# Patient Record
Sex: MALE | Race: WHITE | Employment: PART TIME | URBAN - METROPOLITAN AREA
[De-identification: names, ages, dates, MRNs, and addresses within clinical notes are randomized per-mention and may not be internally consistent; named-entity substitution may affect disease eponyms.]

---

## 2017-09-12 PROBLEM — F90.9 ATTENTION DEFICIT DISORDER OF CHILDHOOD WITH HYPERACTIVITY: Status: ACTIVE | Noted: 2017-09-12

## 2019-05-06 ENCOUNTER — HOSPITAL ENCOUNTER (EMERGENCY)
Age: 39
Discharge: HOME | End: 2019-05-06
Attending: EMERGENCY MEDICINE
Payer: COMMERCIAL

## 2019-05-06 VITALS
TEMPERATURE: 98.2 F | WEIGHT: 315 LBS | RESPIRATION RATE: 21 BRPM | SYSTOLIC BLOOD PRESSURE: 156 MMHG | BODY MASS INDEX: 42.66 KG/M2 | OXYGEN SATURATION: 99 % | HEIGHT: 72 IN | DIASTOLIC BLOOD PRESSURE: 95 MMHG | HEART RATE: 84 BPM

## 2019-05-06 DIAGNOSIS — E11.9 TYPE 2 DIABETES MELLITUS WITHOUT COMPLICATION, WITHOUT LONG-TERM CURRENT USE OF INSULIN (HCC): Primary | ICD-10-CM

## 2019-05-06 LAB
A/G RATIO: 0.9 (ref 1.1–2.2)
ALBUMIN SERPL-MCNC: 3.4 G/DL (ref 3.4–5)
ALP BLD-CCNC: 107 U/L (ref 40–129)
ALT SERPL-CCNC: 25 U/L (ref 10–40)
ANION GAP SERPL CALCULATED.3IONS-SCNC: 13 MMOL/L (ref 3–16)
AST SERPL-CCNC: 17 U/L (ref 15–37)
BASE EXCESS VENOUS: 0.7 MMOL/L
BASOPHILS ABSOLUTE: 0 K/UL (ref 0–0.2)
BASOPHILS RELATIVE PERCENT: 0.4 %
BETA-HYDROXYBUTYRATE: 0.11 MMOL/L (ref 0–0.27)
BILIRUB SERPL-MCNC: 0.3 MG/DL (ref 0–1)
BILIRUBIN URINE: NEGATIVE
BLOOD, URINE: NEGATIVE
BUN BLDV-MCNC: 9 MG/DL (ref 7–20)
CALCIUM SERPL-MCNC: 8.6 MG/DL (ref 8.3–10.6)
CARBOXYHEMOGLOBIN: 2.2 %
CHLORIDE BLD-SCNC: 99 MMOL/L (ref 99–110)
CLARITY: CLEAR
CO2: 24 MMOL/L (ref 21–32)
COLOR: YELLOW
CREAT SERPL-MCNC: 0.8 MG/DL (ref 0.9–1.3)
EOSINOPHILS ABSOLUTE: 0.2 K/UL (ref 0–0.6)
EOSINOPHILS RELATIVE PERCENT: 1.9 %
GFR AFRICAN AMERICAN: >60
GFR NON-AFRICAN AMERICAN: >60
GLOBULIN: 3.7 G/DL
GLUCOSE BLD-MCNC: 318 MG/DL (ref 70–99)
GLUCOSE BLD-MCNC: 361 MG/DL (ref 70–99)
GLUCOSE BLD-MCNC: 382 MG/DL (ref 70–99)
GLUCOSE BLD-MCNC: 448 MG/DL (ref 70–99)
GLUCOSE URINE: >=1000 MG/DL
HCO3 VENOUS: 25 MMOL/L (ref 23–29)
HCT VFR BLD CALC: 40.8 % (ref 40.5–52.5)
HEMOGLOBIN: 13.8 G/DL (ref 13.5–17.5)
KETONES, URINE: NEGATIVE MG/DL
LEUKOCYTE ESTERASE, URINE: NEGATIVE
LYMPHOCYTES ABSOLUTE: 1.8 K/UL (ref 1–5.1)
LYMPHOCYTES RELATIVE PERCENT: 21 %
MCH RBC QN AUTO: 28.6 PG (ref 26–34)
MCHC RBC AUTO-ENTMCNC: 33.9 G/DL (ref 31–36)
MCV RBC AUTO: 84.1 FL (ref 80–100)
METHEMOGLOBIN VENOUS: 0.8 %
MICROSCOPIC EXAMINATION: ABNORMAL
MONOCYTES ABSOLUTE: 0.6 K/UL (ref 0–1.3)
MONOCYTES RELATIVE PERCENT: 6.7 %
NEUTROPHILS ABSOLUTE: 6 K/UL (ref 1.7–7.7)
NEUTROPHILS RELATIVE PERCENT: 70 %
NITRITE, URINE: NEGATIVE
O2 CONTENT, VEN: 19 ML/DL
O2 SAT, VEN: 100 %
O2 THERAPY: NORMAL
PCO2, VEN: 40 MMHG (ref 40–50)
PDW BLD-RTO: 13.6 % (ref 12.4–15.4)
PERFORMED ON: ABNORMAL
PH UA: 5.5 (ref 5–8)
PH VENOUS: 7.41 (ref 7.35–7.45)
PLATELET # BLD: 247 K/UL (ref 135–450)
PMV BLD AUTO: 7.8 FL (ref 5–10.5)
PO2, VEN: 199 MMHG
POTASSIUM REFLEX MAGNESIUM: 4 MMOL/L (ref 3.5–5.1)
PROTEIN UA: NEGATIVE MG/DL
RBC # BLD: 4.84 M/UL (ref 4.2–5.9)
SODIUM BLD-SCNC: 136 MMOL/L (ref 136–145)
SPECIFIC GRAVITY UA: >1.03 (ref 1–1.03)
TCO2 CALC VENOUS: 26 MMOL/L
TOTAL PROTEIN: 7.1 G/DL (ref 6.4–8.2)
URINE REFLEX TO CULTURE: ABNORMAL
URINE TYPE: ABNORMAL
UROBILINOGEN, URINE: 0.2 E.U./DL
WBC # BLD: 8.6 K/UL (ref 4–11)

## 2019-05-06 PROCEDURE — 81003 URINALYSIS AUTO W/O SCOPE: CPT

## 2019-05-06 PROCEDURE — 80053 COMPREHEN METABOLIC PANEL: CPT

## 2019-05-06 PROCEDURE — 96374 THER/PROPH/DIAG INJ IV PUSH: CPT

## 2019-05-06 PROCEDURE — 96361 HYDRATE IV INFUSION ADD-ON: CPT

## 2019-05-06 PROCEDURE — 82803 BLOOD GASES ANY COMBINATION: CPT

## 2019-05-06 PROCEDURE — 6370000000 HC RX 637 (ALT 250 FOR IP): Performed by: EMERGENCY MEDICINE

## 2019-05-06 PROCEDURE — 82010 KETONE BODYS QUAN: CPT

## 2019-05-06 PROCEDURE — 85025 COMPLETE CBC W/AUTO DIFF WBC: CPT

## 2019-05-06 PROCEDURE — 99284 EMERGENCY DEPT VISIT MOD MDM: CPT

## 2019-05-06 PROCEDURE — 2580000003 HC RX 258: Performed by: PHYSICIAN ASSISTANT

## 2019-05-06 RX ORDER — 0.9 % SODIUM CHLORIDE 0.9 %
1000 INTRAVENOUS SOLUTION INTRAVENOUS ONCE
Status: COMPLETED | OUTPATIENT
Start: 2019-05-06 | End: 2019-05-06

## 2019-05-06 RX ADMIN — SODIUM CHLORIDE 1000 ML: 9 INJECTION, SOLUTION INTRAVENOUS at 15:38

## 2019-05-06 RX ADMIN — INSULIN HUMAN 5 UNITS: 100 INJECTION, SOLUTION PARENTERAL at 16:24

## 2019-05-06 RX ADMIN — METFORMIN HYDROCHLORIDE 500 MG: 500 TABLET, FILM COATED ORAL at 17:12

## 2019-05-06 ASSESSMENT — ENCOUNTER SYMPTOMS
NAUSEA: 1
ABDOMINAL PAIN: 0
SHORTNESS OF BREATH: 0
COUGH: 0
CONSTIPATION: 0
VOMITING: 0
COLOR CHANGE: 0
PHOTOPHOBIA: 0
DIARRHEA: 0

## 2019-05-06 NOTE — ED TRIAGE NOTES
Pt arrives to the ER via private vehicle due to his doctor calling him telling him to come due to high blood sugar. Pt gave blood on Thursday at doctors and just got in contact with him today. Pt states increased thirst and urination. NAD noted, respirations even and easy, skin warm and dry.

## 2019-05-06 NOTE — ED PROVIDER NOTES
Attending Supervising Physicians Attestation Statement  I was present with the Mid Level Provider during the history and exam. I discussed the findings and plans with the Mid Level Provider and agree as documented in her note     New DM    Start on Metformin    Well appearing patient    No DKA       Insulin given to initially to bring sugar down    I discussed with patient the results of evaluation in the ED, diagnosis, care, and prognosis. The plan is to discharge to home. Patient is in agreement with plan and questions have been answered.      I also discussed with patient the reasons which may require a return visit and the importance of follow-up care. The patient is well-appearing, nontoxic, and improved at the time of discharge. Patient agrees to call to arrange follow-up care as directed. Patient understands to return immediately for worsening/change in symptoms.       Vitals:    05/06/19 1450 05/06/19 1541 05/06/19 1614   BP: 131/77 127/67 (!) 125/46   Pulse: 95 85 83   Resp: 18 15 19   Temp: 98.2 °F (36.8 °C)     TempSrc: Oral     SpO2: 100% 93% 95%   Weight: (!) 333 lb 5.4 oz (151.2 kg)     Height:  6' (1.829 m)        Electronically signed by Julian Lopez MD on 5/6/19 at 4:22 PM       Julian Lopez MD  05/06/19 8504

## 2019-05-06 NOTE — ED PROVIDER NOTES
[address] [facility]        Pt Name: Epi Merchant  MRN: [id number]  [place] 1980  Date of evaluation: 5/6/2019  Provider: JACOB Morejon  PCP: No primary care provider on file. This patient was seen and evaluated by the attending physician Azra Lopez MD.      [address] [place] [address]       Chief Complaint   Patient presents with    Hyperglycemia     per MD office blood drawn on friday pt had BS of 430       HISTORY OF PRESENT ILLNESS   (Location/Symptom, Timing/Onset, Context/Setting, Quality, Duration, Modifying Factors, Severity)  Note limiting factors. Epi Merchant is a 43 y.o. male who presents the emergency department today for evaluation for elevated blood sugar. He states that he was at the urgent care office last week, due to having a GI bug that his son also was suffering from. They called him today telling him that his glucose was high and that he needed to go to the emergency department right away. The patient states that he has had some mild nausea, some mild abdominal discomfort, but no diarrhea or constipation. He states that he has been noticing he has been drinking more and having increased urination. Denies any vision changes, chest pain, shortness of breath. He does not have a history of diabetes, and he is not sure of his family has history of diabetes. He does not have a primary care provider at this time and only gets his medical care from urgent cares and emergency rooms. There are no further complaints at this time. Nursing Notes were all reviewed and agreed with or any disagreements were addressed  in the HPI. REVIEW OF SYSTEMS    (2-9 systems for level 4, 10 or more for level 5)     Review of Systems   Constitutional: Negative for chills and fever. Eyes: Negative for photophobia and visual disturbance. Respiratory: Negative for cough and shortness of breath.     Cardiovascular: Negative for chest pain and palpitations. Gastrointestinal: Positive for nausea. Negative for abdominal pain, constipation, diarrhea and vomiting. Endocrine: Positive for polydipsia and polyuria. Negative for polyphagia. Genitourinary: Positive for frequency. Negative for dysuria and urgency. Skin: Negative for color change and rash. Neurological: Negative for dizziness, weakness, light-headedness and numbness. Positives and Pertinent negatives as per HPI. Except as noted abovein the ROS, all other systems were reviewed and negative. PAST MEDICAL HISTORY     Past Medical History:   Diagnosis Date    Vertigo          SURGICAL HISTORY     Past Surgical History:   Procedure Laterality Date    KNEE ARTHROSCOPY  12/18/12    left-lateral meniscectomy & chondroplasty    KNEE CARTILAGE SURGERY Left     KNEE SURGERY Left 2012         CURRENTMEDICATIONS       Previous Medications    No medications on file         ALLERGIES     Patient has no known allergies.     FAMILYHISTORY       Family History   Problem Relation Age of Onset    Rheum Arthritis Mother           SOCIAL HISTORY       Social History     Socioeconomic History    Marital status: Single     Spouse name: None    Number of children: None    Years of education: None    Highest education level: None   Occupational History    None   Social Needs    Financial resource strain: None    Food insecurity:     Worry: None     Inability: None    Transportation needs:     Medical: None     Non-medical: None   Tobacco Use    Smoking status: Never Smoker    Smokeless tobacco: Never Used   Substance and Sexual Activity    Alcohol use: Yes     Comment: occ    Drug use: No    Sexual activity: None   Lifestyle    Physical activity:     Days per week: None     Minutes per session: None    Stress: None   Relationships    Social connections:     Talks on phone: None     Gets together: None     Attends Cheondoism service: None     Active member of club or organization: None     Attends meetings of clubs or organizations: None     Relationship status: None    Intimate partner violence:     Fear of current or ex partner: None     Emotionally abused: None     Physically abused: None     Forced sexual activity: None   Other Topics Concern    None   Social History Narrative    None       SCREENINGS             PHYSICAL EXAM    (up to 7 for level 4, 8 or more for level 5)     ED Triage Vitals   BP Temp Temp Source Pulse Resp SpO2 Height Weight   05/06/19 1450 05/06/19 1450 05/06/19 1450 05/06/19 1450 05/06/19 1450 05/06/19 1450 05/06/19 1541 05/06/19 1450   131/77 98.2 °F (36.8 °C) Oral 95 18 100 % 6' (1.829 m) (!) 333 lb 5.4 oz (151.2 kg)       Physical Exam   Constitutional: He is oriented to person, place, and time. He appears well-developed and well-nourished. He is cooperative. Non-toxic appearance. He does not appear ill. No distress. HENT:   Head: Normocephalic and atraumatic. Mouth/Throat: No oropharyngeal exudate. Eyes: Pupils are equal, round, and reactive to light. Conjunctivae and EOM are normal.   Cardiovascular: Normal rate, regular rhythm, normal heart sounds and intact distal pulses. Pulmonary/Chest: Effort normal. No respiratory distress. Abdominal: Soft. Bowel sounds are normal. He exhibits no distension and no mass. There is no tenderness. There is no guarding. obese   Musculoskeletal: Normal range of motion. Neurological: He is alert and oriented to person, place, and time. Skin: Skin is warm and dry. No rash noted. He is not diaphoretic. Psychiatric: He has a normal mood and affect. His behavior is normal. Thought content normal.   Nursing note and vitals reviewed.       DIAGNOSTIC RESULTS   LABS:    Labs Reviewed   COMPREHENSIVE METABOLIC PANEL W/ REFLEX TO MG FOR LOW K - Abnormal; Notable for the following components:       Result Value    Glucose 448 (*)     CREATININE 0.8 (*)     Albumin/Globulin Ratio 0.9 (*)     All other components within normal limits    Narrative:     Performed at:  [facility]  [address] [address] [address]   Phone [phone]   URINE RT REFLEX TO CULTURE - Abnormal; Notable for the following components:    Glucose, Ur >=1000 (*)     All other components within normal limits    Narrative:     Performed at:  [facility]  [address] [facility] [address] [address]   Phone [phone]   POCT GLUCOSE - Abnormal; Notable for the following components:    POC Glucose 382 (*)     All other components within normal limits    Narrative:     Performed at:  [facility]  [address] [facility] [address] [address]   Phone [phone]   POCT GLUCOSE - Abnormal; Notable for the following components:    POC Glucose 361 (*)     All other components within normal limits    Narrative:     Performed at:  [facility]  [address] [facility] [address] [address]   Phone [phone]   CBC WITH AUTO DIFFERENTIAL    Narrative:     Performed at:  [facility]  [address] [facility] [address] [address]   Phone [phone]   BLOOD GAS, VENOUS    Narrative:     Performed at:  [facility]  [address] [facility] [address] [address]   Phone [phone]   BETA-HYDROXYBUTYRATE    Narrative:     Performed at:  [facility]  [facility] [address]   Phone [phone]       All other labs were within normal range or not returned as of this dictation. EKG: All EKG's are interpreted by the Emergency Department Physician who either signs orCo-signs this chart in the absence of a cardiologist.  Please see their note for interpretation of EKG.       RADIOLOGY:   Non-plain film images such as CT, Ultrasound and MRI are read by the radiologist. Plain radiographic images are not elevated at 0.11. On venous blood gases pH 7.4, PCO2 is 40, and HCO3 is 25. Patient feels improved. He is given an urgent PCP referral, will be discharged home with prescription for metformin. He is given very strict return precautions and advised seems to establish care with a primary care provider to help further manage this. The patient is agreeable with plan of care and disposition.  -  Disposition:   home in stable condition    FINAL IMPRESSION      1.  Type 2 diabetes mellitus without complication, without long-term current use of insulin [facility])          DISPOSITION/PLAN   DISPOSITION Discharge - Pending Orders Complete 05/06/2019 04:16:50 PM      PATIENT REFERREDTO:  Lucero Arias  [phone]  Schedule an appointment as soon as possible for a visit   for a re-check in  1-2  days    [facility] Emergency Department  [address] [place] [address]  [phone]    If symptoms worsen      DISCHARGE MEDICATIONS:  New Prescriptions    METFORMIN (GLUCOPHAGE) 500 MG TABLET    Take 1 tablet by mouth 2 times daily (with meals)       DISCONTINUED MEDICATIONS:  Discontinued Medications    No medications on file              (Please note that portions ofthis note were completed with a voice recognition program.  Efforts were made to edit the dictations but occasionally words are mis-transcribed.)    JACOB Shen (electronically signed)            Julianna Shen  05/06/19 4532

## 2019-05-06 NOTE — ED NOTES
Blood sugar [facility], [address] [facility]  05/06/19 0459

## 2019-09-07 ENCOUNTER — HOSPITAL ENCOUNTER (EMERGENCY)
Age: 39
Discharge: HOME | End: 2019-09-07
Attending: EMERGENCY MEDICINE
Payer: COMMERCIAL

## 2019-09-07 VITALS
RESPIRATION RATE: 18 BRPM | SYSTOLIC BLOOD PRESSURE: 126 MMHG | DIASTOLIC BLOOD PRESSURE: 73 MMHG | TEMPERATURE: 98.9 F | HEIGHT: 72 IN | HEART RATE: 96 BPM | BODY MASS INDEX: 42.66 KG/M2 | WEIGHT: 315 LBS | OXYGEN SATURATION: 96 %

## 2019-09-07 DIAGNOSIS — L03.119 CELLULITIS AND ABSCESS OF LEG: ICD-10-CM

## 2019-09-07 DIAGNOSIS — R21 RASH AND OTHER NONSPECIFIC SKIN ERUPTION: Primary | ICD-10-CM

## 2019-09-07 DIAGNOSIS — L02.419 CELLULITIS AND ABSCESS OF LEG: ICD-10-CM

## 2019-09-07 PROCEDURE — 6370000000 HC RX 637 (ALT 250 FOR IP): Performed by: EMERGENCY MEDICINE

## 2019-09-07 PROCEDURE — 99282 EMERGENCY DEPT VISIT SF MDM: CPT

## 2019-09-07 RX ORDER — PREDNISONE 10 MG/1
60 TABLET ORAL DAILY
Qty: 30 TABLET | Refills: 0 | Status: SHIPPED | OUTPATIENT
Start: 2019-09-07 | End: 2019-09-12

## 2019-09-07 RX ORDER — CLINDAMYCIN HYDROCHLORIDE 300 MG/1
300 CAPSULE ORAL 2 TIMES DAILY
Qty: 14 CAPSULE | Refills: 0 | Status: SHIPPED | OUTPATIENT
Start: 2019-09-07 | End: 2019-09-14

## 2019-09-07 RX ORDER — CLINDAMYCIN HYDROCHLORIDE 150 MG/1
300 CAPSULE ORAL ONCE
Status: COMPLETED | OUTPATIENT
Start: 2019-09-07 | End: 2019-09-07

## 2019-09-07 RX ORDER — PREDNISONE 20 MG/1
60 TABLET ORAL ONCE
Status: COMPLETED | OUTPATIENT
Start: 2019-09-07 | End: 2019-09-07

## 2019-09-07 RX ADMIN — PREDNISONE 60 MG: 20 TABLET ORAL at 02:13

## 2019-09-07 RX ADMIN — CLINDAMYCIN HYDROCHLORIDE 300 MG: 150 CAPSULE ORAL at 02:13

## 2019-09-07 ASSESSMENT — PAIN DESCRIPTION - ORIENTATION: ORIENTATION: RIGHT;LEFT

## 2019-09-07 ASSESSMENT — PAIN SCALES - GENERAL
PAINLEVEL_OUTOF10: 2
PAINLEVEL_OUTOF10: 2

## 2019-09-07 ASSESSMENT — PAIN DESCRIPTION - FREQUENCY: FREQUENCY: CONTINUOUS

## 2019-09-07 ASSESSMENT — PAIN DESCRIPTION - PROGRESSION: CLINICAL_PROGRESSION: GRADUALLY IMPROVING

## 2019-09-07 ASSESSMENT — PAIN DESCRIPTION - PAIN TYPE: TYPE: ACUTE PAIN

## 2019-09-07 ASSESSMENT — PAIN DESCRIPTION - ONSET: ONSET: ON-GOING

## 2019-09-07 ASSESSMENT — PAIN DESCRIPTION - LOCATION: LOCATION: LEG

## 2019-09-07 ASSESSMENT — ENCOUNTER SYMPTOMS: COLOR CHANGE: 0

## 2019-09-07 ASSESSMENT — PAIN DESCRIPTION - DESCRIPTORS: DESCRIPTORS: BURNING

## 2019-09-07 NOTE — ED PROVIDER NOTES
[address] [facility]  eMERGENCY dEPARTMENTeNCOUnter      Pt Name: Chago Perez  MRN: [id number]  [place] 1980  Date ofevaluation: 9/7/2019  Provider: Sherrie Russell MD    CHIEF COMPLAINT       Chief Complaint   Patient presents with    Rash     x 1 weeks         HISTORY OF PRESENT ILLNESS   (Location/Symptom, Timing/Onset,Context/Setting, Quality, Duration, Modifying Factors, Severity)  Note limiting factors. Chago Perez is a 35 y.o. male who presents to the emergency department violation of rash. Patient states that for the past week he has had a rash that has been spreading over his body. It is pruritic and erythematous and raised. Patient states also had swelling to the left lower extremity and some straw-colored fluid. He denies fevers nausea or vomiting patient states he has no known allergies. . Patient denies any real new exposures. Patient denies changes in hygiene products or detergents. Patient states he has not been outside or travel. Patient states he did visit friends who have cats that stay outside, which she was in contact with. HPI    NursingNotes were reviewed. REVIEW OF SYSTEMS    (2-9 systems for level 4, 10 or more for level 5)     Review of Systems   Constitutional: Negative for diaphoresis and fever. Skin: Positive for rash. Negative for color change. Except as noted above the remainder of the review of systems was reviewed and negative.        PAST MEDICAL HISTORY     Past Medical History:   Diagnosis Date    Vertigo          SURGICALHISTORY       Past Surgical History:   Procedure Laterality Date    KNEE ARTHROSCOPY  12/18/12    left-lateral meniscectomy & chondroplasty    KNEE CARTILAGE SURGERY Left     KNEE SURGERY Left 2012         CURRENT MEDICATIONS       Discharge Medication List as of 9/7/2019  2:04 AM      CONTINUE these medications which have NOT CHANGED    Details   metFORMIN (GLUCOPHAGE) 500 MG tablet Take 1 tablet dermatitis. Patient states he was exposed recent 2 outdoor cats at a friend's house. Patient has been using topical corticosteroids which help with the itching but did not clear the rash up. Patient also has an area of erythema and warmth to the left lower leg which appears to be cellulitis. Discussed treating the patient with a course of oral corticosteroids as well as oral antibiotics. Patient given a dose in the emergency department. REASSESSMENT          CRITICAL CARE TIME   Total Critical Care time was 0 minutes, excluding separatelyreportable procedures. There was a high probability ofclinically significant/life threatening deterioration in the patient's condition which required my urgent intervention. CONSULTS:  None    PROCEDURES:  Unless otherwise noted below, none     Procedures    FINAL IMPRESSION      1. Rash and other nonspecific skin eruption    2.  Cellulitis and abscess of leg          DISPOSITION/PLAN   DISPOSITION Decision To Discharge 09/07/2019 01:52:23 AM      PATIENT REFERREDTO:  [facility] [address]  Pre-Services  [phone]          DISCHARGEMEDICATIONS:  Discharge Medication List as of 9/7/2019  2:04 AM      START taking these medications    Details   predniSONE (DELTASONE) 10 MG tablet Take 6 tablets by mouth daily for 5 doses, Disp-30 tablet, R-0Print      clindamycin (CLEOCIN) 300 MG capsule Take 1 capsule by mouth 2 times daily for 7 days, Disp-14 capsule, R-0Print                (Please note that portions of this note were completed with a voice recognition program.  Efforts were made to edit the dictations but occasionally words are mis-transcribed.)    Lexy Carter MD (electronically signed)  Attending Emergency Physician          Lexy Carter MD  09/07/19 6061

## 2019-09-30 ENCOUNTER — TELEPHONE (OUTPATIENT)
Dept: OTHER | Age: 39
End: 2019-09-30

## 2019-09-30 NOTE — TELEPHONE ENCOUNTER
ED Advocate called patient in follow up from ED visit. No answer, message left offering to assist in locating a PCP.

## 2021-04-08 ENCOUNTER — APPOINTMENT (OUTPATIENT)
Dept: CT IMAGING | Age: 41
DRG: 153 | End: 2021-04-08
Payer: COMMERCIAL

## 2021-04-08 ENCOUNTER — HOSPITAL ENCOUNTER (INPATIENT)
Age: 41
LOS: 1 days | Discharge: HOME | DRG: 153 | End: 2021-04-09
Attending: INTERNAL MEDICINE | Admitting: INTERNAL MEDICINE
Payer: COMMERCIAL

## 2021-04-08 ENCOUNTER — APPOINTMENT (OUTPATIENT)
Dept: INTERVENTIONAL RADIOLOGY/VASCULAR | Age: 41
DRG: 153 | End: 2021-04-08
Payer: COMMERCIAL

## 2021-04-08 DIAGNOSIS — R50.9 FEVER, UNSPECIFIED FEVER CAUSE: ICD-10-CM

## 2021-04-08 DIAGNOSIS — M54.2 NECK PAIN: Primary | ICD-10-CM

## 2021-04-08 PROBLEM — R29.818 SUSPECTED INFECTIOUS MENINGITIS: Status: ACTIVE | Noted: 2021-04-08

## 2021-04-08 LAB
ANION GAP SERPL CALCULATED.3IONS-SCNC: 11 MMOL/L (ref 3–16)
APPEARANCE CSF: CLEAR
BASOPHILS ABSOLUTE: 0.1 K/UL (ref 0–0.2)
BASOPHILS RELATIVE PERCENT: 0.4 %
BILIRUBIN URINE: NEGATIVE
BLOOD, URINE: NEGATIVE
BUN BLDV-MCNC: 10 MG/DL (ref 7–20)
CALCIUM SERPL-MCNC: 8.6 MG/DL (ref 8.3–10.6)
CHLORIDE BLD-SCNC: 100 MMOL/L (ref 99–110)
CLARITY: CLEAR
CLOT EVALUATION CSF: NORMAL
CO2: 25 MMOL/L (ref 21–32)
COLOR CSF: COLORLESS
COLOR: YELLOW
CREAT SERPL-MCNC: 0.8 MG/DL (ref 0.9–1.3)
EOSINOPHILS ABSOLUTE: 0.2 K/UL (ref 0–0.6)
EOSINOPHILS RELATIVE PERCENT: 1.3 %
ESTIMATED AVERAGE GLUCOSE: 231.7 MG/DL
GFR AFRICAN AMERICAN: >60
GFR NON-AFRICAN AMERICAN: >60
GLUCOSE BLD-MCNC: 143 MG/DL (ref 70–99)
GLUCOSE BLD-MCNC: 230 MG/DL (ref 70–99)
GLUCOSE BLD-MCNC: 278 MG/DL (ref 70–99)
GLUCOSE BLD-MCNC: 287 MG/DL (ref 70–99)
GLUCOSE BLD-MCNC: 311 MG/DL (ref 70–99)
GLUCOSE URINE: >=1000 MG/DL
GLUCOSE, CSF: 118 MG/DL (ref 40–80)
HBA1C MFR BLD: 9.7 %
HCT VFR BLD CALC: 39.8 % (ref 40.5–52.5)
HEMOGLOBIN: 13.5 G/DL (ref 13.5–17.5)
KETONES, URINE: ABNORMAL MG/DL
LACTIC ACID: 2.7 MMOL/L (ref 0.4–2)
LEUKOCYTE ESTERASE, URINE: NEGATIVE
LYMPHOCYTES ABSOLUTE: 1.6 K/UL (ref 1–5.1)
LYMPHOCYTES RELATIVE PERCENT: 11.4 %
MAGNESIUM: 1.6 MG/DL (ref 1.8–2.4)
MCH RBC QN AUTO: 29.2 PG (ref 26–34)
MCHC RBC AUTO-ENTMCNC: 33.9 G/DL (ref 31–36)
MCV RBC AUTO: 86.1 FL (ref 80–100)
MENINGITIS ENCEPHALITIS PANEL: NORMAL
MICROSCOPIC EXAMINATION: ABNORMAL
MONOCYTES ABSOLUTE: 1.1 K/UL (ref 0–1.3)
MONOCYTES RELATIVE PERCENT: 8 %
NEUTROPHILS ABSOLUTE: 10.8 K/UL (ref 1.7–7.7)
NEUTROPHILS RELATIVE PERCENT: 78.9 %
NITRITE, URINE: NEGATIVE
NO DIFFERENTIAL CSF: NORMAL
PDW BLD-RTO: 13.3 % (ref 12.4–15.4)
PERFORMED ON: ABNORMAL
PH UA: 6.5 (ref 5–8)
PLATELET # BLD: 304 K/UL (ref 135–450)
PMV BLD AUTO: 7.4 FL (ref 5–10.5)
POTASSIUM REFLEX MAGNESIUM: 3.5 MMOL/L (ref 3.5–5.1)
PROTEIN CSF: 34 MG/DL (ref 15–45)
PROTEIN UA: NEGATIVE MG/DL
RAPID INFLUENZA  B AGN: NEGATIVE
RAPID INFLUENZA A AGN: NEGATIVE
RBC # BLD: 4.62 M/UL (ref 4.2–5.9)
RBC CSF: 0 /CUMM
REPORT: NORMAL
S PYO AG THROAT QL: NEGATIVE
SODIUM BLD-SCNC: 136 MMOL/L (ref 136–145)
SPECIFIC GRAVITY UA: 1.02 (ref 1–1.03)
TUBE NUMBER CSF: NORMAL
URINE REFLEX TO CULTURE: ABNORMAL
URINE TYPE: ABNORMAL
UROBILINOGEN, URINE: 0.2 E.U./DL
VOLUME CSF: 10 ML
WBC # BLD: 13.7 K/UL (ref 4–11)
WBC CSF: 0 /CUMM (ref 0–5)

## 2021-04-08 PROCEDURE — 87040 BLOOD CULTURE FOR BACTERIA: CPT

## 2021-04-08 PROCEDURE — 83735 ASSAY OF MAGNESIUM: CPT

## 2021-04-08 PROCEDURE — 70498 CT ANGIOGRAPHY NECK: CPT

## 2021-04-08 PROCEDURE — 87483 CNS DNA AMP PROBE TYPE 12-25: CPT

## 2021-04-08 PROCEDURE — 6360000002 HC RX W HCPCS: Performed by: EMERGENCY MEDICINE

## 2021-04-08 PROCEDURE — 87070 CULTURE OTHR SPECIMN AEROBIC: CPT

## 2021-04-08 PROCEDURE — 009U3ZX DRAINAGE OF SPINAL CANAL, PERCUTANEOUS APPROACH, DIAGNOSTIC: ICD-10-PCS | Performed by: RADIOLOGY

## 2021-04-08 PROCEDURE — 83605 ASSAY OF LACTIC ACID: CPT

## 2021-04-08 PROCEDURE — 2580000003 HC RX 258: Performed by: STUDENT IN AN ORGANIZED HEALTH CARE EDUCATION/TRAINING PROGRAM

## 2021-04-08 PROCEDURE — 2580000003 HC RX 258: Performed by: EMERGENCY MEDICINE

## 2021-04-08 PROCEDURE — 87081 CULTURE SCREEN ONLY: CPT

## 2021-04-08 PROCEDURE — 62328 DX LMBR SPI PNXR W/FLUOR/CT: CPT

## 2021-04-08 PROCEDURE — 6360000002 HC RX W HCPCS: Performed by: STUDENT IN AN ORGANIZED HEALTH CARE EDUCATION/TRAINING PROGRAM

## 2021-04-08 PROCEDURE — 87205 SMEAR GRAM STAIN: CPT

## 2021-04-08 PROCEDURE — 85025 COMPLETE CBC W/AUTO DIFF WBC: CPT

## 2021-04-08 PROCEDURE — 87635 SARS-COV-2 COVID-19 AMP PRB: CPT

## 2021-04-08 PROCEDURE — 87880 STREP A ASSAY W/OPTIC: CPT

## 2021-04-08 PROCEDURE — 72125 CT NECK SPINE W/O DYE: CPT

## 2021-04-08 PROCEDURE — 80048 BASIC METABOLIC PNL TOTAL CA: CPT

## 2021-04-08 PROCEDURE — 2709999900 IR LUMBAR PUNCTURE FOR DIAGNOSIS

## 2021-04-08 PROCEDURE — 6370000000 HC RX 637 (ALT 250 FOR IP): Performed by: EMERGENCY MEDICINE

## 2021-04-08 PROCEDURE — 84157 ASSAY OF PROTEIN OTHER: CPT

## 2021-04-08 PROCEDURE — 6370000000 HC RX 637 (ALT 250 FOR IP): Performed by: STUDENT IN AN ORGANIZED HEALTH CARE EDUCATION/TRAINING PROGRAM

## 2021-04-08 PROCEDURE — 1200000000 HC SEMI PRIVATE

## 2021-04-08 PROCEDURE — 87804 INFLUENZA ASSAY W/OPTIC: CPT

## 2021-04-08 PROCEDURE — 36415 COLL VENOUS BLD VENIPUNCTURE: CPT

## 2021-04-08 PROCEDURE — 6370000000 HC RX 637 (ALT 250 FOR IP): Performed by: INTERNAL MEDICINE

## 2021-04-08 PROCEDURE — 89050 BODY FLUID CELL COUNT: CPT

## 2021-04-08 PROCEDURE — 99284 EMERGENCY DEPT VISIT MOD MDM: CPT

## 2021-04-08 PROCEDURE — 96375 TX/PRO/DX INJ NEW DRUG ADDON: CPT

## 2021-04-08 PROCEDURE — 81003 URINALYSIS AUTO W/O SCOPE: CPT

## 2021-04-08 PROCEDURE — 83036 HEMOGLOBIN GLYCOSYLATED A1C: CPT

## 2021-04-08 PROCEDURE — 70450 CT HEAD/BRAIN W/O DYE: CPT

## 2021-04-08 PROCEDURE — 82945 GLUCOSE OTHER FLUID: CPT

## 2021-04-08 PROCEDURE — 96365 THER/PROPH/DIAG IV INF INIT: CPT

## 2021-04-08 PROCEDURE — 6360000004 HC RX CONTRAST MEDICATION: Performed by: INTERNAL MEDICINE

## 2021-04-08 RX ORDER — NICOTINE POLACRILEX 4 MG
15 LOZENGE BUCCAL PRN
Status: DISCONTINUED | OUTPATIENT
Start: 2021-04-08 | End: 2021-04-09 | Stop reason: HOSPADM

## 2021-04-08 RX ORDER — ONDANSETRON 2 MG/ML
4 INJECTION INTRAMUSCULAR; INTRAVENOUS EVERY 6 HOURS PRN
Status: DISCONTINUED | OUTPATIENT
Start: 2021-04-08 | End: 2021-04-09 | Stop reason: HOSPADM

## 2021-04-08 RX ORDER — INSULIN GLARGINE 100 [IU]/ML
50-100 INJECTION, SOLUTION SUBCUTANEOUS 2 TIMES DAILY
COMMUNITY

## 2021-04-08 RX ORDER — SODIUM CHLORIDE 0.9 % (FLUSH) 0.9 %
5-40 SYRINGE (ML) INJECTION PRN
Status: DISCONTINUED | OUTPATIENT
Start: 2021-04-08 | End: 2021-04-09 | Stop reason: HOSPADM

## 2021-04-08 RX ORDER — ACETAMINOPHEN 325 MG/1
650 TABLET ORAL EVERY 6 HOURS PRN
Status: DISCONTINUED | OUTPATIENT
Start: 2021-04-08 | End: 2021-04-09 | Stop reason: HOSPADM

## 2021-04-08 RX ORDER — DULAGLUTIDE 1.5 MG/.5ML
1.5 INJECTION, SOLUTION SUBCUTANEOUS WEEKLY
COMMUNITY

## 2021-04-08 RX ORDER — LISINOPRIL 10 MG/1
10 TABLET ORAL DAILY
COMMUNITY

## 2021-04-08 RX ORDER — 0.9 % SODIUM CHLORIDE 0.9 %
1000 INTRAVENOUS SOLUTION INTRAVENOUS ONCE
Status: COMPLETED | OUTPATIENT
Start: 2021-04-08 | End: 2021-04-08

## 2021-04-08 RX ORDER — ACETAMINOPHEN 650 MG/1
650 SUPPOSITORY RECTAL EVERY 6 HOURS PRN
Status: DISCONTINUED | OUTPATIENT
Start: 2021-04-08 | End: 2021-04-09 | Stop reason: HOSPADM

## 2021-04-08 RX ORDER — PROMETHAZINE HYDROCHLORIDE 25 MG/1
12.5 TABLET ORAL EVERY 6 HOURS PRN
Status: DISCONTINUED | OUTPATIENT
Start: 2021-04-08 | End: 2021-04-09 | Stop reason: HOSPADM

## 2021-04-08 RX ORDER — CYCLOBENZAPRINE HCL 10 MG
10 TABLET ORAL ONCE
Status: COMPLETED | OUTPATIENT
Start: 2021-04-08 | End: 2021-04-08

## 2021-04-08 RX ORDER — DEXAMETHASONE SODIUM PHOSPHATE 10 MG/ML
10 INJECTION, SOLUTION INTRAMUSCULAR; INTRAVENOUS ONCE
Status: COMPLETED | OUTPATIENT
Start: 2021-04-08 | End: 2021-04-08

## 2021-04-08 RX ORDER — MORPHINE SULFATE 2 MG/ML
2 INJECTION, SOLUTION INTRAMUSCULAR; INTRAVENOUS EVERY 4 HOURS PRN
Status: DISCONTINUED | OUTPATIENT
Start: 2021-04-08 | End: 2021-04-09

## 2021-04-08 RX ORDER — METFORMIN HYDROCHLORIDE 500 MG/1
2000 TABLET, EXTENDED RELEASE ORAL
COMMUNITY

## 2021-04-08 RX ORDER — DUPILUMAB 300 MG/2ML
300 INJECTION, SOLUTION SUBCUTANEOUS
COMMUNITY

## 2021-04-08 RX ORDER — SODIUM CHLORIDE 9 MG/ML
25 INJECTION, SOLUTION INTRAVENOUS PRN
Status: DISCONTINUED | OUTPATIENT
Start: 2021-04-08 | End: 2021-04-09 | Stop reason: HOSPADM

## 2021-04-08 RX ORDER — ATORVASTATIN CALCIUM 20 MG/1
20 TABLET, FILM COATED ORAL DAILY
COMMUNITY

## 2021-04-08 RX ORDER — SODIUM CHLORIDE 0.9 % (FLUSH) 0.9 %
5-40 SYRINGE (ML) INJECTION EVERY 12 HOURS SCHEDULED
Status: DISCONTINUED | OUTPATIENT
Start: 2021-04-08 | End: 2021-04-09 | Stop reason: HOSPADM

## 2021-04-08 RX ORDER — DEXTROSE MONOHYDRATE 25 G/50ML
12.5 INJECTION, SOLUTION INTRAVENOUS PRN
Status: DISCONTINUED | OUTPATIENT
Start: 2021-04-08 | End: 2021-04-09 | Stop reason: HOSPADM

## 2021-04-08 RX ORDER — DEXTROSE MONOHYDRATE 50 MG/ML
100 INJECTION, SOLUTION INTRAVENOUS PRN
Status: DISCONTINUED | OUTPATIENT
Start: 2021-04-08 | End: 2021-04-09 | Stop reason: HOSPADM

## 2021-04-08 RX ORDER — MORPHINE SULFATE 4 MG/ML
4 INJECTION, SOLUTION INTRAMUSCULAR; INTRAVENOUS EVERY 4 HOURS PRN
Status: DISCONTINUED | OUTPATIENT
Start: 2021-04-08 | End: 2021-04-09

## 2021-04-08 RX ORDER — KETOROLAC TROMETHAMINE 30 MG/ML
30 INJECTION, SOLUTION INTRAMUSCULAR; INTRAVENOUS ONCE
Status: COMPLETED | OUTPATIENT
Start: 2021-04-08 | End: 2021-04-08

## 2021-04-08 RX ADMIN — DEXAMETHASONE SODIUM PHOSPHATE 25.2 MG: 4 INJECTION, SOLUTION INTRA-ARTICULAR; INTRALESIONAL; INTRAMUSCULAR; INTRAVENOUS; SOFT TISSUE at 15:30

## 2021-04-08 RX ADMIN — CEFTRIAXONE 2000 MG: 2 INJECTION, POWDER, FOR SOLUTION INTRAMUSCULAR; INTRAVENOUS at 03:25

## 2021-04-08 RX ADMIN — VANCOMYCIN HYDROCHLORIDE 1250 MG: 10 INJECTION, POWDER, LYOPHILIZED, FOR SOLUTION INTRAVENOUS at 21:30

## 2021-04-08 RX ADMIN — INSULIN LISPRO 6 UNITS: 100 INJECTION, SOLUTION INTRAVENOUS; SUBCUTANEOUS at 13:04

## 2021-04-08 RX ADMIN — DEXAMETHASONE SODIUM PHOSPHATE 10 MG: 10 INJECTION, SOLUTION INTRAMUSCULAR; INTRAVENOUS at 02:46

## 2021-04-08 RX ADMIN — SODIUM CHLORIDE, PRESERVATIVE FREE 10 ML: 5 INJECTION INTRAVENOUS at 08:08

## 2021-04-08 RX ADMIN — SODIUM CHLORIDE 1000 ML: 9 INJECTION, SOLUTION INTRAVENOUS at 02:46

## 2021-04-08 RX ADMIN — VANCOMYCIN HYDROCHLORIDE 1000 MG: 1 INJECTION, POWDER, LYOPHILIZED, FOR SOLUTION INTRAVENOUS at 04:04

## 2021-04-08 RX ADMIN — MORPHINE SULFATE 2 MG: 2 INJECTION, SOLUTION INTRAMUSCULAR; INTRAVENOUS at 09:50

## 2021-04-08 RX ADMIN — VANCOMYCIN HYDROCHLORIDE 1250 MG: 10 INJECTION, POWDER, LYOPHILIZED, FOR SOLUTION INTRAVENOUS at 13:00

## 2021-04-08 RX ADMIN — DEXAMETHASONE SODIUM PHOSPHATE 25.2 MG: 4 INJECTION, SOLUTION INTRA-ARTICULAR; INTRALESIONAL; INTRAMUSCULAR; INTRAVENOUS; SOFT TISSUE at 08:07

## 2021-04-08 RX ADMIN — SODIUM CHLORIDE, PRESERVATIVE FREE 10 ML: 5 INJECTION INTRAVENOUS at 21:45

## 2021-04-08 RX ADMIN — IOPAMIDOL 75 ML: 755 INJECTION, SOLUTION INTRAVENOUS at 23:13

## 2021-04-08 RX ADMIN — ACETAMINOPHEN 650 MG: 325 TABLET ORAL at 21:30

## 2021-04-08 RX ADMIN — SODIUM CHLORIDE 1000 ML: 9 INJECTION, SOLUTION INTRAVENOUS at 05:22

## 2021-04-08 RX ADMIN — ACETAMINOPHEN 650 MG: 325 TABLET ORAL at 08:07

## 2021-04-08 RX ADMIN — MORPHINE SULFATE 4 MG: 4 INJECTION, SOLUTION INTRAMUSCULAR; INTRAVENOUS at 05:22

## 2021-04-08 RX ADMIN — KETOROLAC TROMETHAMINE 30 MG: 30 INJECTION, SOLUTION INTRAMUSCULAR at 02:45

## 2021-04-08 RX ADMIN — INSULIN LISPRO 6 UNITS: 100 INJECTION, SOLUTION INTRAVENOUS; SUBCUTANEOUS at 17:49

## 2021-04-08 RX ADMIN — INSULIN LISPRO 4 UNITS: 100 INJECTION, SOLUTION INTRAVENOUS; SUBCUTANEOUS at 21:31

## 2021-04-08 RX ADMIN — CYCLOBENZAPRINE 10 MG: 10 TABLET, FILM COATED ORAL at 02:46

## 2021-04-08 RX ADMIN — DEXAMETHASONE SODIUM PHOSPHATE 25.2 MG: 4 INJECTION, SOLUTION INTRA-ARTICULAR; INTRALESIONAL; INTRAMUSCULAR; INTRAVENOUS; SOFT TISSUE at 21:30

## 2021-04-08 RX ADMIN — CEFTRIAXONE 2000 MG: 2 INJECTION, POWDER, FOR SOLUTION INTRAMUSCULAR; INTRAVENOUS at 16:25

## 2021-04-08 ASSESSMENT — ENCOUNTER SYMPTOMS
CHEST TIGHTNESS: 0
PHOTOPHOBIA: 0
ANAL BLEEDING: 0
EYE PAIN: 0
EYE ITCHING: 0
DIARRHEA: 0
ABDOMINAL PAIN: 0
COLOR CHANGE: 0
WHEEZING: 0
BACK PAIN: 0
SHORTNESS OF BREATH: 0
ABDOMINAL DISTENTION: 0
BLOOD IN STOOL: 0
STRIDOR: 0
COUGH: 0
EYE REDNESS: 0
VOMITING: 0
EYE DISCHARGE: 0
CHOKING: 0
APNEA: 0
RECTAL PAIN: 0
NAUSEA: 0
CONSTIPATION: 0

## 2021-04-08 ASSESSMENT — PAIN DESCRIPTION - FREQUENCY
FREQUENCY: INTERMITTENT
FREQUENCY: CONTINUOUS

## 2021-04-08 ASSESSMENT — PAIN DESCRIPTION - ORIENTATION
ORIENTATION: RIGHT;LEFT
ORIENTATION: RIGHT;LEFT

## 2021-04-08 ASSESSMENT — PAIN DESCRIPTION - DESCRIPTORS
DESCRIPTORS: SHOOTING;STABBING
DESCRIPTORS: SHOOTING;STABBING
DESCRIPTORS: ACHING
DESCRIPTORS: SHOOTING;STABBING

## 2021-04-08 ASSESSMENT — PAIN DESCRIPTION - PROGRESSION
CLINICAL_PROGRESSION: NOT CHANGED

## 2021-04-08 ASSESSMENT — PAIN DESCRIPTION - LOCATION
LOCATION: NECK

## 2021-04-08 ASSESSMENT — PAIN - FUNCTIONAL ASSESSMENT
PAIN_FUNCTIONAL_ASSESSMENT: PREVENTS OR INTERFERES WITH MANY ACTIVE NOT PASSIVE ACTIVITIES
PAIN_FUNCTIONAL_ASSESSMENT: PREVENTS OR INTERFERES SOME ACTIVE ACTIVITIES AND ADLS
PAIN_FUNCTIONAL_ASSESSMENT: ACTIVITIES ARE NOT PREVENTED

## 2021-04-08 ASSESSMENT — PAIN SCALES - GENERAL
PAINLEVEL_OUTOF10: 7
PAINLEVEL_OUTOF10: 0
PAINLEVEL_OUTOF10: 3
PAINLEVEL_OUTOF10: 10
PAINLEVEL_OUTOF10: 8
PAINLEVEL_OUTOF10: 7

## 2021-04-08 ASSESSMENT — PAIN DESCRIPTION - ONSET
ONSET: ON-GOING

## 2021-04-08 NOTE — PROGRESS NOTES
4 Eyes Skin Assessment     NAME:  Mundo Brink OF BIRTH:  1980  MEDICAL RECORD NUMBER:  [id number]    The patient is being assess for  Admission    I agree that 2 RN's have performed a thorough Head to Toe Skin Assessment on the patient. ALL assessment sites listed below have been assessed. Areas assessed by both nurses:    Head, Face, Ears, Shoulders, Back, Chest, Arms, Elbows, Hands, Sacrum. Buttock, Coccyx, Ischium and Legs. Feet and Heels        Does the Patient have a Wound?  No noted wound(s)       Kemal Prevention initiated:  NA   Wound Care Orders initiated:  NA    Pressure Injury (Stage 3,4, Unstageable, DTI, NWPT, and Complex wounds) if present place consult order under [de-identified] NA    New and Established Ostomies if present place consult order under : NA      Nurse 1 eSignature: Electronically signed by Kuldip Lakhani RN on 4/8/21 at 5:52 AM EDT    **SHARE this note so that the co-signing nurse is able to place an eSignature**    Nurse 2 eSignature: Electronically signed by Christophe Ge RN on 4/8/21 at 7:45 PM EDT

## 2021-04-08 NOTE — ED PROVIDER NOTES
[address]      Pt Name: Arcadio Ashton  MRN: [id number]  [place] 1980  Date of evaluation: 4/8/2021  Provider: Jenniffer Marin MD    CHIEF COMPLAINT       Chief Complaint   Patient presents with    Neck Pain       HISTORY OF PRESENT ILLNESS    Arcadio Ashton is a 38 y.o. male who presents to the emergency department with neck pain, right ear pain, headache. Has been going on for the last 24 to 48 hours. Positive for fevers. Pain is 10 out of 10 sharp in nature. Endorses neck stiffness. No cough or shortness of breath. Difficulty eating. Never happened before. No other associated symptoms. Nursing Notes were reviewed. Including nursing noted for FM, Surgical History, Past Medical History, Social History, vitals, and allergies; agree with all. REVIEW OF SYSTEMS       Review of Systems   Constitutional: Positive for activity change, appetite change, chills, fatigue and fever. Negative for diaphoresis and unexpected weight change. HENT: Negative for congestion, dental problem, drooling, ear discharge and ear pain. Eyes: Negative for photophobia, pain, discharge, redness, itching and visual disturbance. Respiratory: Negative for apnea, cough, choking, chest tightness, shortness of breath, wheezing and stridor. Cardiovascular: Negative for chest pain, palpitations and leg swelling. Gastrointestinal: Negative for abdominal distention, abdominal pain, anal bleeding, blood in stool, constipation, diarrhea, nausea, rectal pain and vomiting. Endocrine: Negative for cold intolerance and heat intolerance. Genitourinary: Negative for decreased urine volume and urgency. Musculoskeletal: Positive for neck pain. Negative for arthralgias and back pain. Skin: Negative for color change and pallor. Neurological: Positive for headaches. Negative for dizziness and facial asymmetry. Hematological: Negative for adenopathy.  Does not bruise/bleed or organizations: Not on file     Relationship status: Not on file    Intimate partner violence     Fear of current or ex partner: Not on file     Emotionally abused: Not on file     Physically abused: Not on file     Forced sexual activity: Not on file   Other Topics Concern    Not on file   Social History Narrative    Not on file       PHYSICAL EXAM       ED Triage Vitals [04/08/21 0139]   BP Temp Temp Source Pulse Resp SpO2 Height Weight   138/76 99.6 °F (37.6 °C) Oral 98 18 97 % 6' (1.829 m) (!) 363 lb 12.1 oz (165 kg)       Physical Exam  Vitals signs and nursing note reviewed. Constitutional:       General: He is not in acute distress. Appearance: He is well-developed. He is not diaphoretic. HENT:      Head: Normocephalic and atraumatic. Left Ear: Tympanic membrane, ear canal and external ear normal.      Ears:      Comments: Mild erythema to the right TM  Eyes:      General:         Right eye: No discharge. Left eye: No discharge. Pupils: Pupils are equal, round, and reactive to light. Neck:      Musculoskeletal: Normal range of motion. Neck rigidity present. Thyroid: No thyromegaly. Trachea: No tracheal deviation. Cardiovascular:      Rate and Rhythm: Normal rate and regular rhythm. Heart sounds: No murmur. Pulmonary:      Breath sounds: No wheezing or rales. Chest:      Chest wall: No tenderness. Abdominal:      General: There is no distension. Palpations: Abdomen is soft. There is no mass. Tenderness: There is no abdominal tenderness. There is no guarding or rebound. Musculoskeletal: Normal range of motion. General: No tenderness or deformity. Skin:     General: Skin is warm. Coloration: Skin is not pale. Findings: No erythema or rash. Neurological:      Mental Status: He is alert. Cranial Nerves: No cranial nerve deficit. Motor: No abnormal muscle tone.       Coordination: Coordination normal. DIAGNOSTIC RESULTS     EKG: All EKG's are interpreted by the Emergency Department Physician who either signs or Co-signs this chart in the absence of acardiologist.    None    RADIOLOGY:   Non-plain film images such as CT, Ultrasoundand MRI are read by the radiologist. Plain radiographic images are visualized and preliminarily interpreted by the emergency physician with the below findings:    CT Head reassuring     ED BEDSIDE ULTRASOUND:   Performed by ED Physician - none    LABS:  Labs Reviewed   CBC WITH AUTO DIFFERENTIAL - Abnormal; Notable for the following components:       Result Value    WBC 13.7 (*)     Hematocrit 39.8 (*)     Neutrophils Absolute 10.8 (*)     All other components within normal limits    Narrative:     Performed at:  [address] [employer] [address]   Phone [phone]   BASIC METABOLIC PANEL W/ REFLEX TO MG FOR LOW K - Abnormal; Notable for the following components:    Glucose 143 (*)     CREATININE 0.8 (*)     All other components within normal limits    Narrative:     Performed at:  [address] [facility] [employer] [address]   Phone [phone]   LACTIC ACID, PLASMA - Abnormal; Notable for the following components:    Lactic Acid 2.7 (*)     All other components within normal limits    Narrative:     Performed at:  [place] [address], [facility] [employer] [address]   Phone [phone]   MAGNESIUM - Abnormal; Notable for the following components:    Magnesium 1.60 (*)     All other components within normal limits    Narrative:     Performed at:  [address] [facility] [employer] [address]   Phone [phone]   CULTURE, BLOOD 1   CULTURE, BLOOD 1       All other labs were withinnormal range or not returned as of this dictation.     EMERGENCY DEPARTMENT COURSE and DIFFERENTIAL DIAGNOSIS/MDM: PMH, Surgical Hx, FH, Social Hx reviewed by myself (ETOH usage, Tobacco usage, Drug usage reviewed by myself, no pertinent Hx)- No Pertinent Hx     Old records were reviewed by me    59-year-old male with concern for meningitis. IV antibiotics initiated. Steroids started. Discussed with hospitalist.  Will hold off on Valtrex. Patient was 370 pounds therefore did not attempt LP. We will have IR do it in the morning. Admission for further inpatient evaluation. CRITICAL CARE TIME   Total Critical Caretime was 39 minutes, excluding separately reportable procedures. There was a high probability of clinically significant/life threatening deterioration in the patient's condition which required my urgent intervention. PROCEDURES:  Unlessotherwise noted below, none    FINAL IMPRESSION      1. Neck pain    2. Fever, unspecified fever cause          DISPOSITION/PLAN   DISPOSITION Admitted 04/08/2021 03:45:18 AM    PATIENT REFERRED TO:  No follow-up provider specified.     DISCHARGE MEDICATIONS:  Current Discharge Medication List             (Please note that portions ofthis note were completed with a voice recognition program.  Efforts were made to edit the dictations but occasionally words are mis-transcribed.)    Gail Avalos MD(electronically signed)  Attending Emergency Physician        Gail Avalos MD  04/08/21 9362

## 2021-04-08 NOTE — CARE COORDINATION
INITIAL CASE MANAGEMENT ASSESSMENT    Reviewed chart, met with patient to assess possible discharge needs. Explained Case Management role/services. Living Situation: Confirmed address, lives w/ children and their mother, 2 level house, 4 steps w/ railing to enter    ADLs: Independent prior to admission     DME: None    PT/OT Recs: Not ordered     Active Services: None     Transportation: Active  - mother can transport home     Medications: Uses [facility] on [address] - no trouble affording meds    PCP: Day Rizzo NP or Matt Baeza      HD/PD: n/a    PLAN/COMMENTS: Patient plans on returning home at discharge. Doesn't anticipate needing assistance. Monitor for needs. CM provided contact information for patient or family to call with any questions. CM will follow and assist as needed.   Electronically signed by Rosa Mayer RN Case Management [phone] on 4/8/2021 at 11:00 AM

## 2021-04-08 NOTE — PLAN OF CARE
Problem: Pain:  Goal: Pain level will decrease  Description: Pain level will decrease  4/8/2021 1437 by Kat Caro RN  Outcome: Ongoing  4/8/2021 1437 by Kat Caro RN  Outcome: Ongoing  4/8/2021 1436 by Kat Caro RN  Outcome: Ongoing  4/8/2021 1018 by Kat Caro RN  Outcome: Ongoing  Goal: Control of acute pain  Description: Control of acute pain  4/8/2021 1437 by Kat Caro RN  Outcome: Ongoing  4/8/2021 1437 by Kat Caro RN  Outcome: Ongoing  4/8/2021 1436 by Kat Caro RN  Outcome: Ongoing  4/8/2021 1018 by Kat Caro RN  Outcome: Ongoing  Goal: Control of chronic pain  Description: Control of chronic pain  4/8/2021 1437 by Kat Caro RN  Outcome: Ongoing  4/8/2021 1437 by Kat Caro RN  Outcome: Ongoing  4/8/2021 1436 by Kat Caro RN  Outcome: Ongoing  4/8/2021 1018 by Kat Caro RN  Outcome: Ongoing     Problem: Falls - Risk of:  Goal: Will remain free from falls  Description: Will remain free from falls  4/8/2021 1437 by Kat Caro RN  Outcome: Ongoing  4/8/2021 1437 by Kat Caro RN  Outcome: Ongoing  4/8/2021 1436 by Kat Caro RN  Outcome: Ongoing  4/8/2021 1018 by Kat Caro RN  Outcome: Ongoing  Goal: Absence of physical injury  Description: Absence of physical injury  4/8/2021 1437 by Kat Caro RN  Outcome: Ongoing  4/8/2021 1437 by Kat Caro RN  Outcome: Ongoing  4/8/2021 1436 by Kat Caro RN  Outcome: Ongoing  4/8/2021 1018 by Kat Caro RN  Outcome: Ongoing

## 2021-04-08 NOTE — ED TRIAGE NOTES
Patient came in from home complaining of neck pain x1 day. States he woke up with the pain and it progressed throughout the day. Denies any known injury. Pain is 10/10 shooting in on both sides of his neck, but worse on the right. States he can move his head up and down but is very difficult to move it right and left. Also endorsing headache.  A&O x4, vss.

## 2021-04-08 NOTE — LETTER
[facility] [address] [facility]  [address]  Phone: [phone]             April 9, 2021    Patient: Saida Elias   YOB: 1980   Date of Visit: 4/8/2021       To Whom It May Concern:    Alyssa Aguilar was seen and treated in our facility  beginning 4/8/2021 until 4/9/21 . He may return to work on 4/12/21.       Sincerely,       Bentley Heimlich, RN         Signature:__________________________________

## 2021-04-08 NOTE — CONSULTS
Clinical Pharmacy Note  Vancomycin Consult    Prachi Young is a 38 y.o. male ordered Vancomycin for CNS infection; consult received from Dr. Aurora Zhao to manage therapy. Also receiving ceftriaxone. Patient Active Problem List   Diagnosis    Strain of left knee and leg    Lateral meniscal tear    Chondromalacia of patella    S/P left knee arthroscopy    Airway hyperreactivity    Attention deficit disorder of childhood with hyperactivity    Suspected infectious meningitis       Allergies:  Patient has no known allergies. Temp max:  Temp (24hrs), Av.8 °F (37.1 °C), Min:98 °F (36.7 °C), Max:99.6 °F (37.6 °C)      Recent Labs     21  0254   WBC 13.7*       Recent Labs     21  0254   BUN 10   CREATININE 0.8*         Intake/Output Summary (Last 24 hours) at 2021 0531  Last data filed at 2021 0514  Gross per 24 hour   Intake 540 ml   Output --   Net 540 ml         Ht Readings from Last 1 Encounters:   21 6' (1.829 m)        Wt Readings from Last 1 Encounters:   21 (!) 368 lb 2.7 oz (167 kg)         Estimated Creatinine Clearance: 195 mL/min (A) (based on SCr of 0.8 mg/dL (L)). Assessment/Plan:  Vancomycin 1250 mg IV every 8 hours ordered. Regimen projects a trough level of 15-20 mg/L. Level ordered for 21 @0100. Thank you for the consult.    Beto Faust, KadeemD

## 2021-04-08 NOTE — PROGRESS NOTES
Pharmacy Medication Reconciliation Note     List of medications patient is currently taking is complete. Source of information:   1. patient  2. Dispense history    Notes regarding home medications:   1. Added Basaglar  units BID (patient takes 50 or 100 units based on blood sugar)  2. Added Lispro 15 or 20 units tid with meals  3.  Added Metformin,Atorvastatin,Lisinopril,Dupixent,Trulicity,Bactroban      Denies taking any other OTC or herbal medications    Luzma John [place] 4/8/2021 8:14 AM

## 2021-04-08 NOTE — H&P
Hospital Medicine History & Physical      PCP: BEATRIZ Ortiz CNP    Date of Admission: 4/8/2021    Date of Service: Pt seen/examined on 4/8/2021. And Admitted to Inpatient with expected LOS greater than two midnights due to medical therapy. Chief Complaint: Headache, neck pain      History Of Present Illness:   Patient is 54-year-old gentleman with history of diabetes mellitus type 2 came to ER last night with a complaint of right-sided neck, ear pain started yesterday along with sore throat associated with low-grade temperature, headache and blurry vision. Patient reported that his girlfriend had a sore throat. Denies any change of taste or smell. Denies any trauma, ear discharge. On arrival he was noted to have temperature of 99.6, WBC 13.7 CT head without acute finding. Past Medical History:          Diagnosis Date    Vertigo        Past Surgical History:          Procedure Laterality Date    KNEE ARTHROSCOPY  12/18/12    left-lateral meniscectomy & chondroplasty    KNEE CARTILAGE SURGERY Left     KNEE SURGERY Left 2012       Medications Prior to Admission:      Prior to Admission medications    Medication Sig Start Date End Date Taking?  Authorizing Provider   insulin glargine (BASAGLAR KWIKPEN) 100 UNIT/ML injection pen Inject  Units into the skin 2 times daily   Yes Historical Provider, MD   insulin lispro (HUMALOG) 100 UNIT/ML injection vial Inject 15-20 Units into the skin 3 times daily (before meals)   Yes Historical Provider, MD   metFORMIN (GLUCOPHAGE-XR) 500 MG extended release tablet Take 2,000 mg by mouth daily (with breakfast)   Yes Historical Provider, MD   Dulaglutide (TRULICITY) 1.5 GE/2.7NC SOPN Inject 1.5 mg into the skin once a week Saturday   Yes Historical Provider, MD   atorvastatin (LIPITOR) 20 MG tablet Take 20 mg by mouth daily   Yes Historical Provider, MD   Dupilumab (DUPIXENT) 300 MG/2ML SOPN Inject 300 mg into the skin every 14 days   Yes Historical Provider, MD   lisinopril (PRINIVIL;ZESTRIL) 10 MG tablet Take 10 mg by mouth daily   Yes Historical Provider, MD   mupirocin (BACTROBAN) 2 % ointment Apply topically 3 times daily as needed Patient uses for 10 days when needed   Yes Historical Provider, MD       Allergies:  Patient has no known allergies. Social History:      The patient currently lives home, independent    TOBACCO:   reports that he has never smoked. He has never used smokeless tobacco.  ETOH:   reports current alcohol use. Family History:       Reviewed in detail and negative for DM, CAD, Cancer, CVA. Positive as follows:        Problem Relation Age of Onset    Rheum Arthritis Mother        REVIEW OF SYSTEMS:   Pertinent positives as noted in the HPI. All other systems reviewed and negative. PHYSICAL EXAM PERFORMED:    /74   Pulse 71   Temp 98.2 °F (36.8 °C) (Oral)   Resp 19   Ht 6' (1.829 m)   Wt (!) 368 lb 2.7 oz (167 kg)   SpO2 97%   BMI 49.93 kg/m²     General appearance:  No apparent distress, appears stated age and cooperative. HEENT:  Normal cephalic, atraumatic without obvious deformity. Pupils equal, round, and reactive to light. Extra ocular muscles intact. Conjunctivae/corneas clear. Right ear has wax unable to visualize tympanic membrane. Neck: Supple, with full range of motion. No jugular venous distention. Trachea midline. Respiratory:  Normal respiratory effort. Clear to auscultation, bilaterally without Rales/Wheezes/Rhonchi. Cardiovascular:  Regular rate and rhythm with normal S1/S2 without murmurs, rubs or gallops. Abdomen: Soft, non-tender, non-distended with normal bowel sounds. Musculoskeletal:  No clubbing, cyanosis or edema bilaterally. Full range of motion without deformity. Skin: Skin color, texture, turgor normal.  No rashes or lesions. Neurologic:  Neurovascularly intact without any focal sensory/motor deficits.  Cranial nerves: II-XII intact, grossly non-focal.  Psychiatric:  Alert and oriented, thought content appropriate, normal insight  Capillary Refill: Brisk,< 3 seconds   Peripheral Pulses: +2 palpable, equal bilaterally       Labs:     Recent Labs     04/08/21 0254   WBC 13.7*   HGB 13.5   HCT 39.8*        Recent Labs     04/08/21 0254      K 3.5      CO2 25   BUN 10   CREATININE 0.8*   CALCIUM 8.6     No results for input(s): AST, ALT, BILIDIR, BILITOT, ALKPHOS in the last 72 hours. No results for input(s): INR in the last 72 hours. No results for input(s): [place] in the last 72 hours. Urinalysis:      Lab Results   Component Value Date    NITRU Negative 04/08/2021    WBCUA Rare 03/19/2013    RBCUA Rare 03/19/2013    BLOODU Negative 04/08/2021    SPECGRAV 1.021 04/08/2021    GLUCOSEU >=1000 04/08/2021       Radiology:     CXR: I have reviewed the CXR with the following interpretation: See radiology report. EKG:  I have reviewed the EKG with the following interpretation: Not available to review    CT HEAD WO CONTRAST   Final Result   No acute intracranial abnormality. CT CERVICAL SPINE WO CONTRAST   Final Result   No acute abnormality of the cervical spine. IR LUMBAR PUNCTURE FOR DIAGNOSIS    (Results Pending)       ASSESSMENT:    Active Hospital Problems    Diagnosis Date Noted    Suspected infectious meningitis [R29.818] 04/08/2021     #Low-grade temp, headache followed by earache. Suspected meningitis  Follow-up on lumbar puncture. Influenza negative. Rapid strep negative. Continue empiric antibiotics for meningitis along with Decadron. .  Neurology has been consulted. #Suspected otitis media he is on Rocephin and vancomycin for suspected meningitis would be no coverage. #Diabetes mellitus type 2  Continue insulin sliding scale for now.         DVT Prophylaxis: hold chemical as patient will need LP  Diet: DIET CARB CONTROL; Carb Control: 4 carb choices (60 gms)/meal; Low Sodium (2 GM)  Code Status: Full Code    PT/OT Eval Status: N/A    Dispo - inpatient. Pending clinical course. This chart was likely completed using voice recognition technology and may contain unintended grammatical , phraseology,and/or punctuation errors         Adolph Churchill MD    Thank you BEATRIZ Snowden - TYREL for the opportunity to be involved in this patient's care. If you have any questions or concerns please feel free to contact me at [phone].

## 2021-04-08 NOTE — PROGRESS NOTES
Pt arrived to floor via stretcher from ED and ambulated to bed. Telemetry #[id number] activated and confirmed with [facility]. Patient oriented to room and use of call light. Call light and personal items within reach. Admission and assessment initiated. POC and education initiated and reviewed with patient. Droplet isolation precautions initiated. Denied further needs or questions at this time. Will continue to monitor.     Electronically signed by Lon Powell RN on 4/8/2021 at 5:52 AM

## 2021-04-08 NOTE — CONSULTS
Neurology Consult Note  Reason for Consult: ?meningitis    Chief complaint: headache, neck pain    Dr Nisha Goldstein MD asked me to see Marcelo Mott in consultation for evaluation of ?menigitis    History of Present Illness:  Marcelo Mott is a 38 y.o. male who presents with headache, neck pain. I obtained my information via interview w/ the patient, supplemented by chart review. Yesterday at work the patient says he started to notice that the R side of his neck up to his ear began to hurt. He was having a difficult time turning his head to the side. He soon developed a headache. All of his symptoms gradually worsened as the day progressed, to the point he could barely move his head and had a 10+/10 headache. He denies any trauma and does not recall any incident at work that may have triggered his symptoms. He decided to come to the ED this morning due to the persistent nature of his symptoms. BP is normal.  Initial temperature was 99.6F.  WBC 13.7K. CT head was w/out any acute findings. CSF was deferred to IR due to his body habitus. He was empirically initiation on antibiotic therapy. Currently, his headache is gone though he attributes that to the morphine. He continues to have some neck discomfort. He says his hands and feet feel tingly. 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       Medical History:  Past Medical History:   Diagnosis Date    Vertigo      Past Surgical History:   Procedure Laterality Date    KNEE ARTHROSCOPY  12/18/12    left-lateral meniscectomy & chondroplasty    KNEE CARTILAGE SURGERY Left     KNEE SURGERY Left 2012     Scheduled Meds:   sodium chloride flush  5-40 mL Intravenous 2 times per day    cefTRIAXone (ROCEPHIN) IV  2,000 mg Intravenous Q12H    dexamethasone  0.15 mg/kg Intravenous Q6H    vancomycin  1,250 mg Intravenous Q8H    vancomycin (VANCOCIN) intermittent dosing (placeholder)   Other RX Placeholder    insulin lispro  0-12 Units Subcutaneous TID     insulin lispro  0-6 Units Subcutaneous Nightly     Medications Prior to Admission:   insulin glargine (BASAGLAR KWIKPEN) 100 UNIT/ML injection pen, Inject  Units into the skin 2 times daily  insulin lispro (HUMALOG) 100 UNIT/ML injection vial, Inject 15-20 Units into the skin 3 times daily (before meals)  metFORMIN (GLUCOPHAGE-XR) 500 MG extended release tablet, Take 2,000 mg by mouth daily (with breakfast)  Dulaglutide (TRULICITY) 1.5 WF/7.4BM SOPN, Inject 1.5 mg into the skin once a week Saturday  atorvastatin (LIPITOR) 20 MG tablet, Take 20 mg by mouth daily  Dupilumab (DUPIXENT) 300 MG/2ML SOPN, Inject 300 mg into the skin every 14 days  lisinopril (PRINIVIL;ZESTRIL) 10 MG tablet, Take 10 mg by mouth daily  mupirocin (BACTROBAN) 2 % ointment, Apply topically 3 times daily as needed Patient uses for 10 days when needed  metFORMIN (GLUCOPHAGE) 500 MG tablet, Take 1 tablet by mouth 2 times daily (with meals)    No Known Allergies    Family History   Problem Relation Age of Onset    Rheum Arthritis Mother      Social History     Tobacco Use   Smoking Status Never Smoker   Smokeless Tobacco Never Used     Social History     Substance and Sexual Activity   Drug Use No     Social History     Substance and Sexual Activity   Alcohol Use Yes    Comment: occ     ROS:  Constitutional- No weight loss or fevers  Eyes- No diplopia. No photophobia. Ears/nose/throat- No dysphagia. No Dysarthria  Cardiovascular- No palpitations. No chest pain  Respiratory- No dyspnea. No Cough  Gastrointestinal- No Abdominal pain. No Vomiting. Genitourinary- No incontinence. No urinary retention  Musculoskeletal- No myalgia. No arthralgia  Skin- No rash. No easy bruising. Psychiatric- No depression. No anxiety  Endocrine- No diabetes. No thyroid issues. Hematologic- No bleeding difficulty.  No fatigue  Neurologic- No weakness. + Headache. + neck pain    Exam:  Blood pressure 123/74, pulse 71, temperature 98.2 °F (36.8 °C), temperature source Oral, resp. rate 19, height 6' (1.829 m), weight (!) 368 lb 2.7 oz (167 kg), SpO2 97 %. Constitutional    Vital signs: BP, HR, and RR reviewed   General alert, no distress, well-nourished  Eyes: unable to visualize the fundi  Cardiovascular: no peripheral edema. Psychiatric: cooperative with examination, no psychotic behavior noted. Neurologic  Mental status:   orientation to person, place, time, situation. General fund of knowledge grossly intact   Memory grossly intact   Attention intact as able to attend well to the exam     Language fluent in conversation   Comprehension intact; follows simple commands  Cranial nerves:   CN2: visual fields full   CN 3,4,6: extraocular muscles intact  CN5: facial sensation symmetric   CN7: face symmetric without dysarthria  CN8: hearing grossly intact  CN9: palate elevated symmetrically  CN11: trap full strength on shoulder shrug  CN12: tongue midline with protrusion  Strength: good strength in all 4 extremities   Sensory: reports odd feeling in hands/feet. Cerebellar/coordination: no gross ataxia. Tone: normal in all 4 extremities  Gait: deferred at this time for safety. Labs  Glucose 143  Na 136  K 3.5  Mg 1.60  BUN 10  Cr 0.8    Lactic acid 2.7    WBC 13.7K  Hg 13.5  Platelets 609    Blood cultures pending  Flu negative    Studies  CT head w/o 4/8/21, independently reviewed  No acute intracranial abnormality. CT cervical spine w/o 4/8/21, independently reviewed  No acute abnormality of the cervical spine. Impression  1. Headache w/ posterior/R neck discomfort. He has a low grade fever w/ leukocytosis. Consider the possibility of CNS infection. 2.  Leukocytosis. 3.  Lactic acidosis. 4. Hx R Schaefer's palsy, no residual weakness. Recommendations  1. Agree w/ LP and empiric antibiotic coverage. Consider adding antiviral therapy. 2.  May consider additional brain imaging pending CSF. 3.  If CSF is suggestive of infection, would consult ID.    4.  Screen for COVID. 5.  Supportive care.       Jennifer Gutierrez NP  [address] [place] [address] [id number] Neurology    A copy of this note was provided for Dr Michelle Putnam MD

## 2021-04-08 NOTE — PLAN OF CARE
Problem: Pain:  Goal: Pain level will decrease  Description: Pain level will decrease  4/8/2021 1436 by Howard Camara RN  Outcome: Ongoing  4/8/2021 1018 by Howard Camara RN  Outcome: Ongoing  Goal: Control of acute pain  Description: Control of acute pain  4/8/2021 1436 by Howard Camara RN  Outcome: Ongoing  4/8/2021 1018 by Howard Camara RN  Outcome: Ongoing  Goal: Control of chronic pain  Description: Control of chronic pain  4/8/2021 1436 by Howard Camara RN  Outcome: Ongoing  4/8/2021 1018 by Howard Camara RN  Outcome: Ongoing     Problem: Falls - Risk of:  Goal: Will remain free from falls  Description: Will remain free from falls  4/8/2021 1436 by Howard Camara RN  Outcome: Ongoing  4/8/2021 1018 by Howard Camara RN  Outcome: Ongoing  Goal: Absence of physical injury  Description: Absence of physical injury  4/8/2021 1436 by Howard Camara RN  Outcome: Ongoing  4/8/2021 1018 by Howard Camara RN  Outcome: Ongoing

## 2021-04-09 ENCOUNTER — APPOINTMENT (OUTPATIENT)
Dept: MRI IMAGING | Age: 41
DRG: 153 | End: 2021-04-09
Payer: COMMERCIAL

## 2021-04-09 VITALS
RESPIRATION RATE: 18 BRPM | SYSTOLIC BLOOD PRESSURE: 148 MMHG | BODY MASS INDEX: 42.66 KG/M2 | OXYGEN SATURATION: 96 % | HEART RATE: 85 BPM | TEMPERATURE: 97.5 F | DIASTOLIC BLOOD PRESSURE: 76 MMHG | WEIGHT: 315 LBS | HEIGHT: 72 IN

## 2021-04-09 LAB
ANION GAP SERPL CALCULATED.3IONS-SCNC: 12 MMOL/L (ref 3–16)
BASOPHILS ABSOLUTE: 0 K/UL (ref 0–0.2)
BASOPHILS RELATIVE PERCENT: 0.3 %
BUN BLDV-MCNC: 12 MG/DL (ref 7–20)
CALCIUM SERPL-MCNC: 9.2 MG/DL (ref 8.3–10.6)
CHLORIDE BLD-SCNC: 101 MMOL/L (ref 99–110)
CO2: 21 MMOL/L (ref 21–32)
CREAT SERPL-MCNC: 0.7 MG/DL (ref 0.9–1.3)
EOSINOPHILS ABSOLUTE: 0 K/UL (ref 0–0.6)
EOSINOPHILS RELATIVE PERCENT: 0 %
GFR AFRICAN AMERICAN: >60
GFR NON-AFRICAN AMERICAN: >60
GLUCOSE BLD-MCNC: 303 MG/DL (ref 70–99)
GLUCOSE BLD-MCNC: 328 MG/DL (ref 70–99)
GLUCOSE BLD-MCNC: 351 MG/DL (ref 70–99)
HCT VFR BLD CALC: 42.5 % (ref 40.5–52.5)
HEMOGLOBIN: 14.3 G/DL (ref 13.5–17.5)
LYMPHOCYTES ABSOLUTE: 0.8 K/UL (ref 1–5.1)
LYMPHOCYTES RELATIVE PERCENT: 5.3 %
MAGNESIUM: 1.9 MG/DL (ref 1.8–2.4)
MCH RBC QN AUTO: 29.3 PG (ref 26–34)
MCHC RBC AUTO-ENTMCNC: 33.6 G/DL (ref 31–36)
MCV RBC AUTO: 87.2 FL (ref 80–100)
MONOCYTES ABSOLUTE: 0.6 K/UL (ref 0–1.3)
MONOCYTES RELATIVE PERCENT: 3.5 %
NEUTROPHILS ABSOLUTE: 14.3 K/UL (ref 1.7–7.7)
NEUTROPHILS RELATIVE PERCENT: 90.9 %
PDW BLD-RTO: 13.2 % (ref 12.4–15.4)
PERFORMED ON: ABNORMAL
PERFORMED ON: ABNORMAL
PLATELET # BLD: 311 K/UL (ref 135–450)
PMV BLD AUTO: 7.5 FL (ref 5–10.5)
POTASSIUM SERPL-SCNC: 4.4 MMOL/L (ref 3.5–5.1)
RBC # BLD: 4.87 M/UL (ref 4.2–5.9)
SARS-COV-2, NAAT: NOT DETECTED
SODIUM BLD-SCNC: 134 MMOL/L (ref 136–145)
VANCOMYCIN TROUGH: 5.6 UG/ML (ref 10–20)
WBC # BLD: 15.7 K/UL (ref 4–11)

## 2021-04-09 PROCEDURE — 6370000000 HC RX 637 (ALT 250 FOR IP): Performed by: INTERNAL MEDICINE

## 2021-04-09 PROCEDURE — 6360000002 HC RX W HCPCS: Performed by: STUDENT IN AN ORGANIZED HEALTH CARE EDUCATION/TRAINING PROGRAM

## 2021-04-09 PROCEDURE — 70553 MRI BRAIN STEM W/O & W/DYE: CPT

## 2021-04-09 PROCEDURE — A9579 GAD-BASE MR CONTRAST NOS,1ML: HCPCS | Performed by: NURSE PRACTITIONER

## 2021-04-09 PROCEDURE — 6360000004 HC RX CONTRAST MEDICATION: Performed by: NURSE PRACTITIONER

## 2021-04-09 PROCEDURE — 83735 ASSAY OF MAGNESIUM: CPT

## 2021-04-09 PROCEDURE — 80048 BASIC METABOLIC PNL TOTAL CA: CPT

## 2021-04-09 PROCEDURE — 2580000003 HC RX 258: Performed by: STUDENT IN AN ORGANIZED HEALTH CARE EDUCATION/TRAINING PROGRAM

## 2021-04-09 PROCEDURE — 85025 COMPLETE CBC W/AUTO DIFF WBC: CPT

## 2021-04-09 PROCEDURE — 80202 ASSAY OF VANCOMYCIN: CPT

## 2021-04-09 PROCEDURE — 36415 COLL VENOUS BLD VENIPUNCTURE: CPT

## 2021-04-09 RX ORDER — METHOCARBAMOL 500 MG/1
500 TABLET, FILM COATED ORAL 4 TIMES DAILY
Status: DISCONTINUED | OUTPATIENT
Start: 2021-04-09 | End: 2021-04-09 | Stop reason: HOSPADM

## 2021-04-09 RX ORDER — AMOXICILLIN AND CLAVULANATE POTASSIUM 875; 125 MG/1; MG/1
1 TABLET, FILM COATED ORAL 2 TIMES DAILY
Qty: 14 TABLET | Refills: 0 | Status: SHIPPED | OUTPATIENT
Start: 2021-04-09 | End: 2021-04-16

## 2021-04-09 RX ORDER — INSULIN GLARGINE 100 [IU]/ML
20 INJECTION, SOLUTION SUBCUTANEOUS NIGHTLY
Status: DISCONTINUED | OUTPATIENT
Start: 2021-04-09 | End: 2021-04-09 | Stop reason: HOSPADM

## 2021-04-09 RX ORDER — METHOCARBAMOL 500 MG/1
500 TABLET, FILM COATED ORAL 3 TIMES DAILY
Qty: 21 TABLET | Refills: 0 | Status: SHIPPED | OUTPATIENT
Start: 2021-04-09 | End: 2021-04-16

## 2021-04-09 RX ADMIN — INSULIN LISPRO 8 UNITS: 100 INJECTION, SOLUTION INTRAVENOUS; SUBCUTANEOUS at 13:21

## 2021-04-09 RX ADMIN — DEXAMETHASONE SODIUM PHOSPHATE 25.2 MG: 4 INJECTION, SOLUTION INTRA-ARTICULAR; INTRALESIONAL; INTRAMUSCULAR; INTRAVENOUS; SOFT TISSUE at 03:03

## 2021-04-09 RX ADMIN — SODIUM CHLORIDE, PRESERVATIVE FREE 10 ML: 5 INJECTION INTRAVENOUS at 09:17

## 2021-04-09 RX ADMIN — METHOCARBAMOL 500 MG: 500 TABLET ORAL at 13:21

## 2021-04-09 RX ADMIN — VANCOMYCIN HYDROCHLORIDE 1250 MG: 10 INJECTION, POWDER, LYOPHILIZED, FOR SOLUTION INTRAVENOUS at 03:03

## 2021-04-09 RX ADMIN — CEFTRIAXONE 2000 MG: 2 INJECTION, POWDER, FOR SOLUTION INTRAMUSCULAR; INTRAVENOUS at 03:03

## 2021-04-09 RX ADMIN — VANCOMYCIN HYDROCHLORIDE 1250 MG: 10 INJECTION, POWDER, LYOPHILIZED, FOR SOLUTION INTRAVENOUS at 13:21

## 2021-04-09 RX ADMIN — GADOTERIDOL 20 ML: 279.3 INJECTION, SOLUTION INTRAVENOUS at 12:32

## 2021-04-09 RX ADMIN — INSULIN LISPRO 10 UNITS: 100 INJECTION, SOLUTION INTRAVENOUS; SUBCUTANEOUS at 08:42

## 2021-04-09 ASSESSMENT — PAIN DESCRIPTION - ONSET: ONSET: ON-GOING

## 2021-04-09 ASSESSMENT — PAIN - FUNCTIONAL ASSESSMENT: PAIN_FUNCTIONAL_ASSESSMENT: ACTIVITIES ARE NOT PREVENTED

## 2021-04-09 ASSESSMENT — PAIN DESCRIPTION - FREQUENCY: FREQUENCY: INTERMITTENT

## 2021-04-09 ASSESSMENT — PAIN DESCRIPTION - PAIN TYPE: TYPE: ACUTE PAIN

## 2021-04-09 ASSESSMENT — PAIN SCALES - GENERAL: PAINLEVEL_OUTOF10: 4

## 2021-04-09 ASSESSMENT — PAIN DESCRIPTION - LOCATION: LOCATION: NECK

## 2021-04-09 ASSESSMENT — PAIN DESCRIPTION - DESCRIPTORS: DESCRIPTORS: DISCOMFORT

## 2021-04-09 ASSESSMENT — PAIN DESCRIPTION - PROGRESSION: CLINICAL_PROGRESSION: NOT CHANGED

## 2021-04-09 NOTE — DISCHARGE SUMMARY
effort. Clear to auscultation, bilaterally without Rales/Wheezes/Rhonchi. Cardiovascular:  Regular rate and rhythm with normal S1/S2 without murmurs, rubs or gallops. Abdomen: Soft, non-tender, non-distended with normal bowel sounds. Musculoskeletal:  No clubbing, cyanosis or edema bilaterally. Full range of motion without deformity. Skin: Skin color, texture, turgor normal.  No rashes or lesions. Neurologic:  Neurovascularly intact without any focal sensory/motor deficits. Cranial nerves: II-XII intact, grossly non-focal.  Psychiatric:  Alert and oriented, thought content appropriate, normal insight  Capillary Refill: Brisk,< 3 seconds   Peripheral Pulses: +2 palpable, equal bilaterally       Labs: For convenience and continuity at follow-up the following most recent labs are provided:      CBC:    Lab Results   Component Value Date    WBC 15.7 04/09/2021    HGB 14.3 04/09/2021    HCT 42.5 04/09/2021     04/09/2021       Renal:    Lab Results   Component Value Date     04/09/2021    K 4.4 04/09/2021    K 3.5 04/08/2021     04/09/2021    CO2 21 04/09/2021    BUN 12 04/09/2021    CREATININE 0.7 04/09/2021    CALCIUM 9.2 04/09/2021         Significant Diagnostic Studies    Radiology:   MRI BRAIN W WO CONTRAST   Final Result   No acute intracranial abnormality. No acute infarct. CTA HEAD NECK W CONTRAST   Final Result   Unremarkable CTA of the head and neck. IR LUMBAR PUNCTURE FOR DIAGNOSIS   Final Result   Status post fluoroscopic-guided lumbar puncture. CT HEAD WO CONTRAST   Final Result   No acute intracranial abnormality. CT CERVICAL SPINE WO CONTRAST   Final Result   No acute abnormality of the cervical spine.                 Consults:     PHARMACY TO DOSE VANCOMYCIN  IP CONSULT TO NEUROLOGY    Disposition:  home     Condition at Discharge: Stable    Discharge Instructions/Follow-up:  PCP    Code Status:  Full Code     Activity: activity as tolerated    Diet: diabetic diet      Discharge Medications:     Current Discharge Medication List           Details   methocarbamol (ROBAXIN) 500 MG tablet Take 1 tablet by mouth 3 times daily for 7 days  Qty: 21 tablet, Refills: 0      amoxicillin-clavulanate (AUGMENTIN) 875-125 MG per tablet Take 1 tablet by mouth 2 times daily for 7 days  Qty: 14 tablet, Refills: 0              Details   insulin glargine (BASAGLAR KWIKPEN) 100 UNIT/ML injection pen Inject  Units into the skin 2 times daily      insulin lispro (HUMALOG) 100 UNIT/ML injection vial Inject 15-20 Units into the skin 3 times daily (before meals)      metFORMIN (GLUCOPHAGE-XR) 500 MG extended release tablet Take 2,000 mg by mouth daily (with breakfast)      Dulaglutide (TRULICITY) 1.5 UP/0.4TR SOPN Inject 1.5 mg into the skin once a week Saturday      atorvastatin (LIPITOR) 20 MG tablet Take 20 mg by mouth daily      Dupilumab (DUPIXENT) 300 MG/2ML SOPN Inject 300 mg into the skin every 14 days      lisinopril (PRINIVIL;ZESTRIL) 10 MG tablet Take 10 mg by mouth daily      mupirocin (BACTROBAN) 2 % ointment Apply topically 3 times daily as needed Patient uses for 10 days when needed             Time Spent on discharge is more than 30 minutes in the examination, evaluation, counseling and review of medications and discharge plan. This chart was likely completed using voice recognition technology and may contain unintended grammatical , phraseology,and/or punctuation errors      Signed:    Vicki Mensah MD   4/9/2021      Thank you BEATRIZ Stephen - TYREL for the opportunity to be involved in this patient's care. If you have any questions or concerns please feel free to contact me at [phone].

## 2021-04-09 NOTE — PROGRESS NOTES
Physician Progress Note      PATIENT:               Reji Bowers  CSN #:                  [id number]  :                       1980  ADMIT DATE:       2021 1:32 AM  [address] DATE:  RESPONDING  PROVIDER #:        Cristela Vaz MD          QUERY TEXT:    Dear Dr Reuben Rosas,  Patient admitted with BMI 49.07. If possible, please document in progress   notes and discharge summary if you are evaluating and /or treating any of the   following: The medical record reflects the following:  Risk Factors: lifestyle  Clinical Indicators: Documentation per nursing og Ht-6', [id number], and   calculated bmi-49.07. Treatment: monitor,carb control, low na  Thank You, Valdo Novoa RN CDS CRCR  [email or web address]  [phone]  Options provided:  -- Obesity  -- Morbid obesity  -- Overweight  -- BMI not clinically significant  -- Other - I will add my own diagnosis  -- Disagree - Not applicable / Not valid  -- Disagree - Clinically unable to determine / Unknown  -- Refer to Clinical Documentation Reviewer    PROVIDER RESPONSE TEXT:    This patient has morbid obesity. Query created by:  Emma Novoa on 2021 9:13 AM      Electronically signed by:  Cristela Vaz MD 2021 1:20 PM

## 2021-04-09 NOTE — PROGRESS NOTES
Hospitalist Progress Note      PCP: Brian Romero, APRN - CNP    Date of Admission: 4/8/2021    CC ear ache, neck pain  Hospital course patient is 48-year-old gentleman with history of diabetes mellitus type 2, obesity was admitted for right side neck pain, low-grade temperatures a sore throat. Influenza negative, strep throat negative. Had lumbar puncture on 4/8 no sign of infection. Subjective  Patient seen and examined today. Reports that headache is better still having some neck pain. Denies nausea, vomiting, fever, chills. Assessment and plan  #Meningitis ruled out. S/p LP on 4/8. CSF without any neutrophilic. Neuro recommended MRI. #Otitis media will discharge on Augmentin. Patient was instructed to follow-up with PCP in 1 week. #Diabetes mellitus type 2  Resume home medication at time of discharge.       Medications:  Reviewed    Infusion Medications    sodium chloride      dextrose       Scheduled Medications    insulin glargine  20 Units Subcutaneous Nightly    methocarbamol  500 mg Oral 4x Daily    sodium chloride flush  5-40 mL Intravenous 2 times per day    cefTRIAXone (ROCEPHIN) IV  2,000 mg Intravenous Q12H    vancomycin  1,250 mg Intravenous Q8H    vancomycin (VANCOCIN) intermittent dosing (placeholder)   Other RX Placeholder    insulin lispro  0-12 Units Subcutaneous TID WC    insulin lispro  0-6 Units Subcutaneous Nightly     PRN Meds: sodium chloride flush, sodium chloride, promethazine **OR** ondansetron, acetaminophen **OR** acetaminophen, glucose, dextrose, glucagon (rDNA), dextrose      Intake/Output Summary (Last 24 hours) at 4/9/2021 1325  Last data filed at 4/8/2021 2330  Gross per 24 hour   Intake 835 ml   Output 950 ml   Net -115 ml       Physical Exam Performed:    BP (!) 148/76   Pulse 85   Temp 97.5 °F (36.4 °C) (Axillary)   Resp 18   Ht 6' (1.829 m)   Wt (!) 361 lb 12.4 oz (164.1 kg)   SpO2 96%   BMI 49.07 kg/m²     General appearance: No apparent distress, appears stated age and cooperative. HEENT: Pupils equal, round, and reactive to light. Conjunctivae/corneas clear. Neck: Supple, with full range of motion. No jugular venous distention. Trachea midline. Respiratory:  Normal respiratory effort. Clear to auscultation, bilaterally without Rales/Wheezes/Rhonchi. Cardiovascular: Regular rate and rhythm with normal S1/S2 without murmurs, rubs or gallops. Abdomen: Soft, non-tender, non-distended with normal bowel sounds. Musculoskeletal: No clubbing, cyanosis or edema bilaterally. Full range of motion without deformity. Skin: Skin color, texture, turgor normal.  No rashes or lesions. Neurologic:  Neurovascularly intact without any focal sensory/motor deficits. Cranial nerves: II-XII intact, grossly non-focal.  Psychiatric: Alert and oriented, thought content appropriate, normal insight  Capillary Refill: Brisk,< 3 seconds   Peripheral Pulses: +2 palpable, equal bilaterally       Labs:   Recent Labs     04/08/21  0254 04/09/21  0600   WBC 13.7* 15.7*   HGB 13.5 14.3   HCT 39.8* 42.5    311     Recent Labs     04/08/21  0254 04/09/21  0600    134*   K 3.5 4.4    101   CO2 25 21   BUN 10 12   CREATININE 0.8* 0.7*   CALCIUM 8.6 9.2     No results for input(s): AST, ALT, BILIDIR, BILITOT, ALKPHOS in the last 72 hours. No results for input(s): INR in the last 72 hours. No results for input(s): [place] in the last 72 hours. Urinalysis:      Lab Results   Component Value Date    NITRU Negative 04/08/2021    WBCUA Rare 03/19/2013    RBCUA Rare 03/19/2013    BLOODU Negative 04/08/2021    SPECGRAV 1.021 04/08/2021    GLUCOSEU >=1000 04/08/2021       Radiology:  CTA HEAD NECK W CONTRAST   Final Result   Unremarkable CTA of the head and neck. IR LUMBAR PUNCTURE FOR DIAGNOSIS   Final Result   Status post fluoroscopic-guided lumbar puncture.          CT HEAD WO CONTRAST   Final Result   No acute intracranial abnormality. CT CERVICAL SPINE WO CONTRAST   Final Result   No acute abnormality of the cervical spine.          MRI BRAIN W WO CONTRAST    (Results Pending)           Assessment/Plan:    Active Hospital Problems    Diagnosis Date Noted    Suspected infectious meningitis [R29.818] 04/08/2021         DVT Prophylaxis: lovenex  Diet: DIET CARB CONTROL; Carb Control: 4 carb choices (60 gms)/meal; Low Sodium (2 GM)  Code Status: Full Code    PT/OT Eval Status: N/A    Dispo - pending MRI if normal D/C home    This chart was likely completed using voice recognition technology and may contain unintended grammatical , phraseology,and/or punctuation errors      Briana Escobar MD

## 2021-04-09 NOTE — PROGRESS NOTES
Progress Note    Updates  Feeling better. Still has a bit of tenderness on the R side of his neck.       Active Ambulatory Problems     Diagnosis Date Noted    Strain of left knee and leg 11/07/2012    Lateral meniscal tear 12/07/2012    Chondromalacia of patella 12/07/2012    S/P left knee arthroscopy 01/02/2013    Airway hyperreactivity 04/11/2003    Attention deficit disorder of childhood with hyperactivity 09/12/2017     Past Surgical History:   Procedure Laterality Date    KNEE ARTHROSCOPY  12/18/12    left-lateral meniscectomy & chondroplasty    KNEE CARTILAGE SURGERY Left     KNEE SURGERY Left 2012     Current Facility-Administered Medications:     insulin glargine (LANTUS) injection vial 20 Units, 20 Units, Subcutaneous, Nightly, Marleni Sherwood MD    methocarbamol (ROBAXIN) tablet 500 mg, 500 mg, Oral, 4x Daily, Marleni Sherwood MD    sodium chloride flush 0.9 % injection 5-40 mL, 5-40 mL, Intravenous, 2 times per day, Demond MAY Medina, DO, 10 mL at 04/08/21 2145    sodium chloride flush 0.9 % injection 5-40 mL, 5-40 mL, Intravenous, PRN, Demond MAY Medina, DO    0.9 % sodium chloride infusion, 25 mL, Intravenous, PRN, Demond MAY GradyAdam, DO    promethazine (PHENERGAN) tablet 12.5 mg, 12.5 mg, Oral, Q6H PRN **OR** ondansetron (ZOFRAN) injection 4 mg, 4 mg, Intravenous, Q6H PRN, Demond MAY Medina, DO    acetaminophen (TYLENOL) tablet 650 mg, 650 mg, Oral, Q6H PRN, 650 mg at 04/08/21 2130 **OR** acetaminophen (TYLENOL) suppository 650 mg, 650 mg, Rectal, Q6H PRN, Demond MAY GradyAdam, DO    cefTRIAXone (ROCEPHIN) 2000 mg IVPB in D5W 50ml minibag, 2,000 mg, Intravenous, Q12H, Demond MAY Medina, DO, Stopped at 04/09/21 0406    vancomycin (VANCOCIN) 1250 mg in dextrose 5 % 250 mL IVPB, 1,250 mg, Intravenous, Q8H, Demond  Adam, DO, Stopped at 04/09/21 0440    vancomycin (VANCOCIN) intermittent dosing (placeholder), , Other, RX Placeholder, Demond Medina DO    insulin lispro (HUMALOG) injection vial 0-12 Units, 0-12 Units, Subcutaneous, TID WC, Klaudia Florez MD, 10 Units at 04/09/21 0842    insulin lispro (HUMALOG) injection vial 0-6 Units, 0-6 Units, Subcutaneous, Nightly, Klaudia Florez MD, 4 Units at 04/08/21 2131    glucose (GLUTOSE) 40 % oral gel 15 g, 15 g, Oral, PRN, Klaudia Florez MD    dextrose 50 % IV solution, 12.5 g, Intravenous, PRN, Klaudia Florez MD    glucagon (rDNA) injection 1 mg, 1 mg, Intramuscular, PRN, Klaudia Florez MD    dextrose 5 % solution, 100 mL/hr, Intravenous, PRN, Klaudia Florez MD    Exam  Blood pressure 128/72, pulse 77, temperature 97.4 °F (36.3 °C), temperature source Oral, resp. rate 18, height 6' (1.829 m), weight (!) 361 lb 12.4 oz (164.1 kg), SpO2 97 %. Constitutional                          Vital signs: BP, HR, and RR reviewed            General alert, no distress  Eyes: unable to visualize the fundi  Cardiovascular: no peripheral edema. Psychiatric: cooperative with examination, no psychotic behavior noted. Neurologic  Mental status:   orientation to person, place              Attention intact as able to attend well to the exam                Language fluent in conversation              Comprehension intact; follows simple commands  Cranial nerves:   CN2: visual fields full   CN 3,4,6: extraocular muscles intact  CN7: face symmetric without dysarthria  CN8: hearing grossly intact  CN12: tongue midline with protrusion  Strength: good strength in all 4 extremities   Reflexes: normal.      Cerebellar/coordination: no ataxia in BUE. Tone: normal in all 4 extremities  Gait: deferred at this time for safety. ROS  Constitutional- No weight loss or fevers  Eyes- No diplopia. No photophobia. Ears/nose/throat- No dysphagia. No Dysarthria  Cardiovascular- No palpitations. No chest pain  Respiratory- No dyspnea. No Cough  Gastrointestinal- No Abdominal pain. No Vomiting. Genitourinary- No incontinence. No urinary retention  Musculoskeletal- No myalgia.  No

## 2021-04-09 NOTE — PLAN OF CARE
Problem: Pain:  Goal: Pain level will decrease  Description: Pain level will decrease  4/9/2021 1340 by Jennifer Lopez RN  Outcome: Ongoing     Problem: Pain:  Goal: Control of acute pain  Description: Control of acute pain  4/9/2021 1340 by Jennifer Lopez RN  Outcome: Ongoing     Problem: Pain:  Goal: Control of chronic pain  Description: Control of chronic pain  4/9/2021 1340 by Jennifer Lopez RN  Outcome: Ongoing     Problem: Falls - Risk of:  Goal: Will remain free from falls  Description: Will remain free from falls  4/9/2021 1340 by Jennifer Lopez RN  Outcome: Ongoing     Problem: Falls - Risk of:  Goal: Absence of physical injury  Description: Absence of physical injury  4/9/2021 1340 by Jennifer Lopez RN  Outcome: Ongoing

## 2021-04-09 NOTE — PROGRESS NOTES
Data- discharge order received, pt verbalized agreement to discharge, disposition to previous residence, no needs for HHC/DME. Action- discharge instructions prepared and given to pt, pt verbalized understanding. Medication information packet given r/t NEW and/or CHANGED prescriptions emphasizing name/purpose/side effects, pt verbalized understanding. Discharge instruction summary: Diet- carb control, Activity- return to normal, Primary Care Physician as follows: BEATRIZ Angulo - [phone] f/u appointment within 1 week, immunizations reviewed and completed, prescription medications filled [facility]. Response- Pt belongings gathered, IV removed. Disposition is home (no HHC/DME needs), transported with family, taken to lobby via independently, no complications.

## 2021-04-10 LAB — S PYO THROAT QL CULT: NORMAL

## 2021-04-12 LAB
BLOOD CULTURE, ROUTINE: NORMAL
BLOOD CULTURE, ROUTINE: NORMAL

## 2021-04-12 NOTE — PROGRESS NOTES
CLINICAL PHARMACY NOTE: MEDS TO [address] [place] [address] Repairy Select Patient?: No  Total # of Prescriptions Filled: 2   The following medications were delivered to the patient:  Discharge Medication List as of 4/9/2021  2:36 PM      START taking these medications    Details   methocarbamol (ROBAXIN) 500 MG tablet Take 1 tablet by mouth 3 times daily for 7 days, Disp-21 tablet, R-0Normal      amoxicillin-clavulanate (AUGMENTIN) 875-125 MG per tablet Take 1 tablet by mouth 2 times daily for 7 days, Disp-14 tablet, R-0Normal         ·   ·   Total # of Interventions Completed: 0  Time Spent (min): 30    Additional Documentation:

## 2021-04-15 LAB
CSF CULTURE: NORMAL
GRAM STAIN RESULT: NORMAL

## 2023-09-01 ENCOUNTER — HOSPITAL ENCOUNTER (EMERGENCY)
Age: 43
Discharge: HOME | End: 2023-09-01

## 2023-09-01 VITALS
SYSTOLIC BLOOD PRESSURE: 133 MMHG | DIASTOLIC BLOOD PRESSURE: 80 MMHG | HEART RATE: 96 BPM | WEIGHT: 315 LBS | OXYGEN SATURATION: 99 % | BODY MASS INDEX: 46.58 KG/M2 | TEMPERATURE: 97.8 F | RESPIRATION RATE: 16 BRPM

## 2023-09-01 DIAGNOSIS — L03.116 CELLULITIS OF LEFT THIGH: ICD-10-CM

## 2023-09-01 DIAGNOSIS — L03.116 CELLULITIS AND ABSCESS OF LEFT LEG: Primary | ICD-10-CM

## 2023-09-01 DIAGNOSIS — L02.416 CELLULITIS AND ABSCESS OF LEFT LEG: Primary | ICD-10-CM

## 2023-09-01 PROCEDURE — 99283 EMERGENCY DEPT VISIT LOW MDM: CPT

## 2023-09-01 PROCEDURE — 87205 SMEAR GRAM STAIN: CPT

## 2023-09-01 PROCEDURE — 87186 SC STD MICRODIL/AGAR DIL: CPT

## 2023-09-01 PROCEDURE — 6370000000 HC RX 637 (ALT 250 FOR IP): Performed by: PHYSICIAN ASSISTANT

## 2023-09-01 PROCEDURE — 87077 CULTURE AEROBIC IDENTIFY: CPT

## 2023-09-01 PROCEDURE — 87070 CULTURE OTHR SPECIMN AEROBIC: CPT

## 2023-09-01 PROCEDURE — 10060 I&D ABSCESS SIMPLE/SINGLE: CPT

## 2023-09-01 PROCEDURE — 2500000003 HC RX 250 WO HCPCS: Performed by: PHYSICIAN ASSISTANT

## 2023-09-01 RX ORDER — CLINDAMYCIN HYDROCHLORIDE 150 MG/1
300 CAPSULE ORAL ONCE
Status: COMPLETED | OUTPATIENT
Start: 2023-09-01 | End: 2023-09-01

## 2023-09-01 RX ORDER — LIDOCAINE HYDROCHLORIDE AND EPINEPHRINE 10; 10 MG/ML; UG/ML
20 INJECTION, SOLUTION INFILTRATION; PERINEURAL ONCE
Status: COMPLETED | OUTPATIENT
Start: 2023-09-01 | End: 2023-09-01

## 2023-09-01 RX ORDER — IBUPROFEN 600 MG/1
600 TABLET ORAL
Qty: 40 TABLET | Refills: 0 | Status: SHIPPED | OUTPATIENT
Start: 2023-09-01

## 2023-09-01 RX ORDER — CLINDAMYCIN HYDROCHLORIDE 300 MG/1
300 CAPSULE ORAL 3 TIMES DAILY
Qty: 30 CAPSULE | Refills: 0 | Status: SHIPPED | OUTPATIENT
Start: 2023-09-01 | End: 2023-09-11

## 2023-09-01 RX ADMIN — CLINDAMYCIN HYDROCHLORIDE 300 MG: 150 CAPSULE ORAL at 20:26

## 2023-09-01 RX ADMIN — LIDOCAINE HYDROCHLORIDE,EPINEPHRINE BITARTRATE 20 ML: 10; .01 INJECTION, SOLUTION INFILTRATION; PERINEURAL at 20:27

## 2023-09-02 NOTE — ED NOTES
D/C: Order noted for d/c. Pt confirmed d/c paperwork  have correct name. Discharge and education instructions reviewed with patient. Teach-back successful. Pt verbalized understanding and signed d/c papers. Pt denied questions at this time. No acute distress noted. Patient instructed to follow-up as noted - return to emergency department if symptoms worsen. Patient verbalized understanding. Discharged per EDMD with discharge instructions. Pt discharged to private vehicle. Patient stable upon departure. Thanked patient for choosing [facility]) for care.           Nathan Matias RN  09/01/23 [id number]

## 2023-09-03 LAB
BACTERIA SPEC AEROBE CULT: ABNORMAL
BACTERIA SPEC AEROBE CULT: ABNORMAL
GRAM STN SPEC: ABNORMAL
ORGANISM: ABNORMAL
ORGANISM: ABNORMAL

## 2023-10-15 ENCOUNTER — HOSPITAL ENCOUNTER (EMERGENCY)
Age: 43
Discharge: HOME | End: 2023-10-15
Payer: COMMERCIAL

## 2023-10-15 VITALS
HEART RATE: 71 BPM | SYSTOLIC BLOOD PRESSURE: 107 MMHG | TEMPERATURE: 97.9 F | HEIGHT: 72 IN | RESPIRATION RATE: 18 BRPM | WEIGHT: 315 LBS | OXYGEN SATURATION: 97 % | BODY MASS INDEX: 42.66 KG/M2 | DIASTOLIC BLOOD PRESSURE: 75 MMHG

## 2023-10-15 DIAGNOSIS — U07.1 COVID-19 VIRUS INFECTION: Primary | ICD-10-CM

## 2023-10-15 LAB — SARS-COV-2 RDRP RESP QL NAA+PROBE: DETECTED

## 2023-10-15 PROCEDURE — 87635 SARS-COV-2 COVID-19 AMP PRB: CPT

## 2023-10-15 PROCEDURE — 99283 EMERGENCY DEPT VISIT LOW MDM: CPT

## 2023-10-15 ASSESSMENT — PAIN SCALES - GENERAL: PAINLEVEL_OUTOF10: 3

## 2023-10-15 ASSESSMENT — PAIN - FUNCTIONAL ASSESSMENT: PAIN_FUNCTIONAL_ASSESSMENT: 0-10

## 2023-10-15 NOTE — ED TRIAGE NOTES
Pt states he would like covid test. Pt states family member tested positive. Pt has cough, congestion since Thursday and denies SOB.

## 2023-10-15 NOTE — ED PROVIDER NOTES
[address] [facility] [address] [address]        Pt Name: Rosalba Landa  MRN: [id number]  [address] [facility] 1980  Date of evaluation: 10/15/2023  Provider: JACOB Sanon  PCP: BEATRIZ Clarke CNP  Note Started: 5:47 PM EDT 10/15/23      AMY. I have evaluated this patient. CHIEF COMPLAINT       Chief Complaint   Patient presents with    Cough    Otalgia     Pt states he was exposed to covid and would like a test. Symptoms started Thursday. HISTORY OF PRESENT ILLNESS: 1 or more Elements     History From: patient    Rosalba Landa is a 39 y.o. male who presents for sinus pressure, rhinorrhea, bilateral ear pain, cough productive yellow sputum. He was exposed to significant other who has COVID. He denies fever. Denies hemoptysis  Denies nausea vomiting diarrhea. Wants a COVID test.  Symptoms started 3 days ago and are improving. Nursing Notes were reviewed and agreed with or any disagreements were addressed in the HPI. REVIEW OF SYSTEMS :      Review of Systems    Positives and Pertinent negatives as per HPI.      SURGICAL HISTORY     Past Surgical History:   Procedure Laterality Date    KNEE ARTHROSCOPY  12/18/12    left-lateral meniscectomy & chondroplasty    KNEE CARTILAGE SURGERY Left     KNEE SURGERY Left 2012        [facility]       Discharge Medication List as of 10/15/2023  6:27 PM        CONTINUE these medications which have NOT CHANGED    Details   ibuprofen (ADVIL;MOTRIN) 600 MG tablet Take 1 tablet by mouth every 6-8 hours as needed for Pain, Disp-40 tablet, R-0Normal      insulin glargine (BASAGLAR KWIKPEN) 100 UNIT/ML injection pen Inject  Units into the skin 2 times dailyHistorical Med      insulin lispro (HUMALOG) 100 UNIT/ML injection vial Inject 15-20 Units into the skin 3 times daily (before meals)Historical Med      metFORMIN (GLUCOPHAGE-XR) 500 MG extended release tablet Take 2,000 mg by mouth daily

## 2023-10-15 NOTE — DISCHARGE INSTRUCTIONS
-You need to quarantine for days 1-5 from symptoms onset. You can discontinue quarantine after day 5 if you have been fever free for the previous 24 hours (without the use of fever reducing medications such as Tylenol, Ibuprofen, Motrin, Aleve, etc). Then, you need to wear a mask when around others for days 6-10 from symptom onset    -COVID increases your risk of blood clots.   If you do not have any chronic health issues related to bleeding or anemia, it is recommended to take Aspirin 81 mg daily for the next 14 days

## 2024-08-17 ENCOUNTER — HOSPITAL ENCOUNTER (EMERGENCY)
Age: 44
Discharge: HOME | End: 2024-08-17

## 2024-08-17 VITALS
HEART RATE: 83 BPM | OXYGEN SATURATION: 98 % | DIASTOLIC BLOOD PRESSURE: 89 MMHG | SYSTOLIC BLOOD PRESSURE: 133 MMHG | TEMPERATURE: 98 F | BODY MASS INDEX: 42.66 KG/M2 | HEIGHT: 72 IN | WEIGHT: 315 LBS | RESPIRATION RATE: 15 BRPM

## 2024-08-17 DIAGNOSIS — E11.65 HYPERGLYCEMIA DUE TO DIABETES MELLITUS (HCC): Primary | ICD-10-CM

## 2024-08-17 LAB
ALBUMIN SERPL-MCNC: 3.9 G/DL (ref 3.4–5)
ALBUMIN/GLOB SERPL: 1.4 {RATIO} (ref 1.1–2.2)
ALP SERPL-CCNC: 109 U/L (ref 40–129)
ALT SERPL-CCNC: 24 U/L (ref 10–40)
ANION GAP SERPL CALCULATED.3IONS-SCNC: 13 MMOL/L (ref 3–16)
AST SERPL-CCNC: 14 U/L (ref 15–37)
BASE EXCESS BLDV CALC-SCNC: 2.1 MMOL/L (ref -3–3)
BASOPHILS # BLD: 0.1 K/UL (ref 0–0.2)
BASOPHILS NFR BLD: 1.1 %
BETA-HYDROXYBUTYRATE: 0.11 MMOL/L (ref 0–0.27)
BILIRUB SERPL-MCNC: 0.3 MG/DL (ref 0–1)
BILIRUB UR QL STRIP.AUTO: NEGATIVE
BUN SERPL-MCNC: 12 MG/DL (ref 7–20)
CALCIUM SERPL-MCNC: 9 MG/DL (ref 8.3–10.6)
CHLORIDE SERPL-SCNC: 97 MMOL/L (ref 99–110)
CLARITY UR: CLEAR
CO2 BLDV-SCNC: 27 MMOL/L
CO2 SERPL-SCNC: 24 MMOL/L (ref 21–32)
COLOR UR: YELLOW
CREAT SERPL-MCNC: 0.6 MG/DL (ref 0.9–1.3)
DEPRECATED RDW RBC AUTO: 13.7 % (ref 12.4–15.4)
EOSINOPHIL # BLD: 0.3 K/UL (ref 0–0.6)
EOSINOPHIL NFR BLD: 2.6 %
GFR SERPLBLD CREATININE-BSD FMLA CKD-EPI: >90 ML/MIN/{1.73_M2}
GLUCOSE SERPL-MCNC: 274 MG/DL (ref 70–99)
GLUCOSE UR STRIP.AUTO-MCNC: >=1000 MG/DL
HCO3 BLDV-SCNC: 26.6 MMOL/L (ref 23–29)
HCT VFR BLD AUTO: 41.3 % (ref 40.5–52.5)
HGB BLD-MCNC: 14.4 G/DL (ref 13.5–17.5)
HGB UR QL STRIP.AUTO: NEGATIVE
KETONES UR STRIP.AUTO-MCNC: NEGATIVE MG/DL
LACTATE BLDV-SCNC: 1.7 MMOL/L (ref 0.4–1.9)
LEUKOCYTE ESTERASE UR QL STRIP.AUTO: NEGATIVE
LYMPHOCYTES # BLD: 2.3 K/UL (ref 1–5.1)
LYMPHOCYTES NFR BLD: 21.9 %
MCH RBC QN AUTO: 29.4 PG (ref 26–34)
MCHC RBC AUTO-ENTMCNC: 34.9 G/DL (ref 31–36)
MCV RBC AUTO: 84.3 FL (ref 80–100)
MONOCYTES # BLD: 0.7 K/UL (ref 0–1.3)
MONOCYTES NFR BLD: 7 %
NEUTROPHILS # BLD: 7.1 K/UL (ref 1.7–7.7)
NEUTROPHILS NFR BLD: 67.4 %
NITRITE UR QL STRIP.AUTO: NEGATIVE
O2 THERAPY: ABNORMAL
PCO2 BLDV: 41.1 MMHG (ref 40–50)
PH BLDV: 7.42 [PH] (ref 7.35–7.45)
PH UR STRIP.AUTO: 6 [PH] (ref 5–8)
PLATELET # BLD AUTO: 272 K/UL (ref 135–450)
PMV BLD AUTO: 7.6 FL (ref 5–10.5)
PO2 BLDV: 41.1 MMHG (ref 25–40)
POTASSIUM SERPL-SCNC: 4.1 MMOL/L (ref 3.5–5.1)
PROT SERPL-MCNC: 6.6 G/DL (ref 6.4–8.2)
PROT UR STRIP.AUTO-MCNC: NEGATIVE MG/DL
RBC # BLD AUTO: 4.9 M/UL (ref 4.2–5.9)
SAO2 % BLDV: 77 %
SODIUM SERPL-SCNC: 134 MMOL/L (ref 136–145)
SP GR UR STRIP.AUTO: 1.02 (ref 1–1.03)
UA COMPLETE W REFLEX CULTURE PNL UR: ABNORMAL
UA DIPSTICK W REFLEX MICRO PNL UR: ABNORMAL
URN SPEC COLLECT METH UR: ABNORMAL
UROBILINOGEN UR STRIP-ACNC: 0.2 E.U./DL
WBC # BLD AUTO: 10.6 K/UL (ref 4–11)

## 2024-08-17 PROCEDURE — 85025 COMPLETE CBC W/AUTO DIFF WBC: CPT

## 2024-08-17 PROCEDURE — 81003 URINALYSIS AUTO W/O SCOPE: CPT

## 2024-08-17 PROCEDURE — 82010 KETONE BODYS QUAN: CPT

## 2024-08-17 PROCEDURE — 83605 ASSAY OF LACTIC ACID: CPT

## 2024-08-17 PROCEDURE — 96360 HYDRATION IV INFUSION INIT: CPT

## 2024-08-17 PROCEDURE — 99284 EMERGENCY DEPT VISIT MOD MDM: CPT

## 2024-08-17 PROCEDURE — 82803 BLOOD GASES ANY COMBINATION: CPT

## 2024-08-17 PROCEDURE — 36415 COLL VENOUS BLD VENIPUNCTURE: CPT

## 2024-08-17 PROCEDURE — 2580000003 HC RX 258: Performed by: REGISTERED NURSE

## 2024-08-17 PROCEDURE — 80053 COMPREHEN METABOLIC PANEL: CPT

## 2024-08-17 RX ORDER — PIOGLITAZONEHYDROCHLORIDE 15 MG/1
15 TABLET ORAL DAILY
COMMUNITY

## 2024-08-17 RX ORDER — NALTREXONE HYDROCHLORIDE 50 MG/1
12.5 TABLET, FILM COATED ORAL DAILY
COMMUNITY

## 2024-08-17 RX ORDER — ASPIRIN 81 MG/1
81 TABLET, CHEWABLE ORAL DAILY
COMMUNITY

## 2024-08-17 RX ORDER — BUPROPION HYDROCHLORIDE 150 MG/1
150 TABLET ORAL EVERY MORNING
COMMUNITY

## 2024-08-17 RX ORDER — SILDENAFIL 50 MG/1
50 TABLET, FILM COATED ORAL PRN
COMMUNITY

## 2024-08-17 RX ORDER — NICOTINE POLACRILEX 4 MG/1
20 GUM, CHEWING ORAL DAILY
COMMUNITY

## 2024-08-17 RX ORDER — 0.9 % SODIUM CHLORIDE 0.9 %
1000 INTRAVENOUS SOLUTION INTRAVENOUS ONCE
Status: COMPLETED | OUTPATIENT
Start: 2024-08-17 | End: 2024-08-17

## 2024-08-17 RX ADMIN — SODIUM CHLORIDE 1000 ML: 9 INJECTION, SOLUTION INTRAVENOUS at 18:14

## 2024-08-17 ASSESSMENT — PAIN SCALES - GENERAL
PAINLEVEL_OUTOF10: 2
PAINLEVEL_OUTOF10: 0
PAINLEVEL_OUTOF10: 2
PAINLEVEL_OUTOF10: 0

## 2024-08-17 ASSESSMENT — ENCOUNTER SYMPTOMS
DIARRHEA: 0
VOMITING: 0
NAUSEA: 0
CONSTIPATION: 0
SHORTNESS OF BREATH: 0
ABDOMINAL PAIN: 0
CHEST TIGHTNESS: 0

## 2024-08-17 ASSESSMENT — PAIN DESCRIPTION - LOCATION: LOCATION: GENERALIZED

## 2024-08-17 ASSESSMENT — PAIN DESCRIPTION - PAIN TYPE: TYPE: ACUTE PAIN

## 2024-08-17 ASSESSMENT — PAIN - FUNCTIONAL ASSESSMENT: PAIN_FUNCTIONAL_ASSESSMENT: 0-10

## 2024-08-17 ASSESSMENT — PAIN DESCRIPTION - FREQUENCY: FREQUENCY: CONTINUOUS

## 2024-08-17 ASSESSMENT — PAIN DESCRIPTION - ONSET: ONSET: ON-GOING

## 2024-08-17 NOTE — ED NOTES
Dc'd to home  awake alert  resp easy  to follow up with pmd  walked out with ease  to follow up with pmd

## 2024-08-17 NOTE — ED PROVIDER NOTES
[facility]  EMERGENCY DEPARTMENT ENCOUNTER        Pt Name: Kvng Snow  MRN: [id number]  Birthdate 1980  Date of evaluation: 8/17/2024  Provider: BEATRIZ Blakely CNP  PCP: Chaz Venegas APRN - CNP    This patient was not seen and evaluated by the attending physician No att. providers found.    I have evaluated this patient. My supervising physician was available for consultation.      CHIEF COMPLAINT       Chief Complaint   Patient presents with    Fatigue     Generalized bodyaches    Hyperglycemia     306 at 1500  Had recent suicide attempt and they took him off insulin for safety and glucose has been elevated all week       HISTORY OF PRESENT ILLNESS   (Location/Symptom, Timing/Onset, Context/Setting, Quality, Duration, Modifying Factors, Severity)  Note limiting factors.     History from : Patient  Kvng Snow is a 43 y.o. male who presents via private car for fatigue and hyperglycemia.  Patient with history of type 2 diabetes generally was insulin-dependent however had a recent suicide attempt by overdosing on insulin so he is not currently taking insulin.  He states that they restarted him on Trulicity yesterday and he has a follow-up appointment with his PCP on Tuesday to further discuss diabetes management with oral medications only.  He states over the last week he has been persistently hyperglycemic.  He states he generally has readings \"in the mid 200s\" but over the last several days has been mid 300s to 400s.  He states he has begun to feel somewhat fatigued and was concerned that this is because of his sugars being high and is unsure how to treat them further at home.  He denies chest pain, palpitations or swelling his extremities.  He denies shortness of breath, cough congestion or recent fevers.  He denies abdominal pain, nausea, vomiting, diarrhea or constipation.  He denies urinary symptoms including dysuria, urgency.  He denies any

## 2024-11-02 ENCOUNTER — HOSPITAL ENCOUNTER (EMERGENCY)
Age: 44
Discharge: HOME | End: 2024-11-02
Attending: STUDENT IN AN ORGANIZED HEALTH CARE EDUCATION/TRAINING PROGRAM

## 2024-11-02 ENCOUNTER — APPOINTMENT (OUTPATIENT)
Dept: CT IMAGING | Age: 44
End: 2024-11-02

## 2024-11-02 VITALS
OXYGEN SATURATION: 98 % | SYSTOLIC BLOOD PRESSURE: 141 MMHG | HEART RATE: 80 BPM | BODY MASS INDEX: 42.68 KG/M2 | WEIGHT: 314.7 LBS | DIASTOLIC BLOOD PRESSURE: 90 MMHG | TEMPERATURE: 98 F | RESPIRATION RATE: 18 BRPM

## 2024-11-02 DIAGNOSIS — H66.002 ACUTE SUPPURATIVE OTITIS MEDIA OF LEFT EAR WITHOUT SPONTANEOUS RUPTURE OF TYMPANIC MEMBRANE, RECURRENCE NOT SPECIFIED: ICD-10-CM

## 2024-11-02 DIAGNOSIS — R73.9 HYPERGLYCEMIA: Primary | ICD-10-CM

## 2024-11-02 DIAGNOSIS — H60.392 INFECTIVE OTITIS EXTERNA OF LEFT EAR: ICD-10-CM

## 2024-11-02 LAB
ANION GAP SERPL CALCULATED.3IONS-SCNC: 13 MMOL/L (ref 3–16)
BASOPHILS # BLD: 0.1 K/UL (ref 0–0.2)
BASOPHILS NFR BLD: 0.5 %
BUN SERPL-MCNC: 11 MG/DL (ref 7–20)
CALCIUM SERPL-MCNC: 8.7 MG/DL (ref 8.3–10.6)
CHLORIDE SERPL-SCNC: 100 MMOL/L (ref 99–110)
CO2 SERPL-SCNC: 23 MMOL/L (ref 21–32)
CREAT SERPL-MCNC: 0.6 MG/DL (ref 0.9–1.3)
DEPRECATED RDW RBC AUTO: 13.3 % (ref 12.4–15.4)
EOSINOPHIL # BLD: 0.2 K/UL (ref 0–0.6)
EOSINOPHIL NFR BLD: 1.8 %
GFR SERPLBLD CREATININE-BSD FMLA CKD-EPI: >90 ML/MIN/{1.73_M2}
GLUCOSE SERPL-MCNC: 381 MG/DL (ref 70–99)
HCT VFR BLD AUTO: 41 % (ref 40.5–52.5)
HGB BLD-MCNC: 14.4 G/DL (ref 13.5–17.5)
LYMPHOCYTES # BLD: 1.7 K/UL (ref 1–5.1)
LYMPHOCYTES NFR BLD: 16 %
MCH RBC QN AUTO: 30.2 PG (ref 26–34)
MCHC RBC AUTO-ENTMCNC: 35 G/DL (ref 31–36)
MCV RBC AUTO: 86.4 FL (ref 80–100)
MONOCYTES # BLD: 1.1 K/UL (ref 0–1.3)
MONOCYTES NFR BLD: 10.5 %
NEUTROPHILS # BLD: 7.8 K/UL (ref 1.7–7.7)
NEUTROPHILS NFR BLD: 71.2 %
PLATELET # BLD AUTO: 246 K/UL (ref 135–450)
PMV BLD AUTO: 7.9 FL (ref 5–10.5)
POTASSIUM SERPL-SCNC: 3.6 MMOL/L (ref 3.5–5.1)
RBC # BLD AUTO: 4.75 M/UL (ref 4.2–5.9)
SODIUM SERPL-SCNC: 136 MMOL/L (ref 136–145)
WBC # BLD AUTO: 10.9 K/UL (ref 4–11)

## 2024-11-02 PROCEDURE — 6360000004 HC RX CONTRAST MEDICATION: Performed by: STUDENT IN AN ORGANIZED HEALTH CARE EDUCATION/TRAINING PROGRAM

## 2024-11-02 PROCEDURE — 96374 THER/PROPH/DIAG INJ IV PUSH: CPT

## 2024-11-02 PROCEDURE — 6360000002 HC RX W HCPCS: Performed by: STUDENT IN AN ORGANIZED HEALTH CARE EDUCATION/TRAINING PROGRAM

## 2024-11-02 PROCEDURE — 80048 BASIC METABOLIC PNL TOTAL CA: CPT

## 2024-11-02 PROCEDURE — 99285 EMERGENCY DEPT VISIT HI MDM: CPT

## 2024-11-02 PROCEDURE — 85025 COMPLETE CBC W/AUTO DIFF WBC: CPT

## 2024-11-02 PROCEDURE — 70481 CT ORBIT/EAR/FOSSA W/DYE: CPT

## 2024-11-02 PROCEDURE — 6370000000 HC RX 637 (ALT 250 FOR IP): Performed by: STUDENT IN AN ORGANIZED HEALTH CARE EDUCATION/TRAINING PROGRAM

## 2024-11-02 RX ORDER — CIPROFLOXACIN AND DEXAMETHASONE 3; 1 MG/ML; MG/ML
4 SUSPENSION/ DROPS AURICULAR (OTIC) 2 TIMES DAILY
Qty: 7.5 ML | Refills: 0 | Status: SHIPPED | OUTPATIENT
Start: 2024-11-02 | End: 2024-11-09

## 2024-11-02 RX ORDER — CIPROFLOXACIN AND DEXAMETHASONE 3; 1 MG/ML; MG/ML
4 SUSPENSION/ DROPS AURICULAR (OTIC) 2 TIMES DAILY
Qty: 7.5 ML | Refills: 0 | Status: SHIPPED | OUTPATIENT
Start: 2024-11-02 | End: 2024-11-02

## 2024-11-02 RX ORDER — KETOROLAC TROMETHAMINE 15 MG/ML
15 INJECTION, SOLUTION INTRAMUSCULAR; INTRAVENOUS ONCE
Status: COMPLETED | OUTPATIENT
Start: 2024-11-02 | End: 2024-11-02

## 2024-11-02 RX ADMIN — KETOROLAC TROMETHAMINE 15 MG: 15 INJECTION, SOLUTION INTRAMUSCULAR; INTRAVENOUS at 07:52

## 2024-11-02 RX ADMIN — AMOXICILLIN AND CLAVULANATE POTASSIUM 1 TABLET: 875; 125 TABLET, FILM COATED ORAL at 07:52

## 2024-11-02 RX ADMIN — IOMEPROL INJECTION 100 ML: 714 INJECTION, SOLUTION INTRAVASCULAR at 09:10

## 2024-11-02 ASSESSMENT — PAIN - FUNCTIONAL ASSESSMENT
PAIN_FUNCTIONAL_ASSESSMENT: 0-10
PAIN_FUNCTIONAL_ASSESSMENT: ACTIVITIES ARE NOT PREVENTED
PAIN_FUNCTIONAL_ASSESSMENT: 0-10

## 2024-11-02 ASSESSMENT — PAIN SCALES - GENERAL
PAINLEVEL_OUTOF10: 9
PAINLEVEL_OUTOF10: 9
PAINLEVEL_OUTOF10: 6
PAINLEVEL_OUTOF10: 7

## 2024-11-02 ASSESSMENT — PAIN DESCRIPTION - LOCATION
LOCATION: EAR
LOCATION: EAR

## 2024-11-02 ASSESSMENT — LIFESTYLE VARIABLES
HOW OFTEN DO YOU HAVE A DRINK CONTAINING ALCOHOL: NEVER
HOW MANY STANDARD DRINKS CONTAINING ALCOHOL DO YOU HAVE ON A TYPICAL DAY: PATIENT DOES NOT DRINK

## 2024-11-02 ASSESSMENT — PAIN DESCRIPTION - ORIENTATION
ORIENTATION: LEFT
ORIENTATION: LEFT

## 2024-11-02 ASSESSMENT — PAIN DESCRIPTION - FREQUENCY: FREQUENCY: CONTINUOUS

## 2024-11-02 ASSESSMENT — PAIN DESCRIPTION - ONSET: ONSET: ON-GOING

## 2024-11-02 ASSESSMENT — PAIN DESCRIPTION - PAIN TYPE: TYPE: ACUTE PAIN

## 2024-11-02 ASSESSMENT — PAIN DESCRIPTION - DESCRIPTORS
DESCRIPTORS: PRESSURE
DESCRIPTORS: PRESSURE

## 2024-11-02 NOTE — DISCHARGE INSTRUCTIONS
Today you are seen here in Emergency Department for ear pain.  Your CT scan overall is reassuring.  You need to return for worsening pain, fevers, mental status changes tenderness to the posterior portion of the ear.  Otherwise please follow-up with your ENT doctors.  Your CT did show an incidental finding in your right ear that needs to have follow-up with ENT    CT IAC POSTERIOR FOSSA W CONTRAST   Final Result   1. Mucosal thickening is seen on the external auditory canals, left more than   right.   2. There is minimal opacification within Prussak's space on the right.   3. The mastoid air cells are clear bilaterally.

## 2024-11-02 NOTE — ED PROVIDER NOTES
EMERGENCY DEPARTMENT ENCOUNTER      CHIEF COMPLAINT    Ear Pain (Left ear pain for the past 2 days he reports that he tried to flush with salt water thought it might be wax, he is has increased pressure over the past two day)      LUIS Snow is a 44 y.o. male with past medical history significant for DM who presents with ear pain  Patient is here today with left-sided ear pain has been having symptoms for the last 2 days appears the progressive initially thought symptoms are due to a earwax buildup so try using some salt water into the ear  He since had worsening of his pain denies fevers denies chills denies any difficulty swallowing denies any mental status changes    PAST MEDICAL HISTORY    Past Medical History:   Diagnosis Date    Depression     Diabetes mellitus (HCC)     Hypertension     Vertigo        SURGICAL HISTORY    Past Surgical History:   Procedure Laterality Date    KNEE ARTHROSCOPY  12/18/12    left-lateral meniscectomy & chondroplasty    KNEE CARTILAGE SURGERY Left     KNEE SURGERY Left 2012       CURRENT MEDICATIONS    Current Outpatient Rx   Medication Sig Dispense Refill    amoxicillin-clavulanate (AUGMENTIN) 875-125 MG per tablet Take 1 tablet by mouth 2 times daily for 7 days 14 tablet 0    ciprofloxacin-dexAMETHasone (CIPRODEX) 0.3-0.1 % otic suspension Place 4 drops into both ears 2 times daily for 7 days 7.5 mL 0    metFORMIN (GLUCOPHAGE-XR) 500 MG extended release tablet Take 2 tablets by mouth daily (with breakfast)      sildenafil (VIAGRA) 50 MG tablet Take 1 tablet by mouth as needed for Erectile Dysfunction      aspirin 81 MG chewable tablet Take 1 tablet by mouth daily      naltrexone (DEPADE) 50 MG tablet Take 0.25 tablets by mouth daily      pioglitazone (ACTOS) 15 MG tablet Take 1 tablet by mouth daily      buPROPion (WELLBUTRIN XL) 150 MG extended release tablet Take 1 tablet by mouth every morning      omeprazole 20 MG EC tablet Take 1 tablet by mouth daily       to be just anterior to the pinna as well as to the subauricular lymph nodes.  There is notable edema with purulence into the bilateral canals left worse than right, able to visualize the left tympanic membrane secondary to edema there is some pain with pinna manipulation.  The right dependent membrane does appear to be normal.  The posterior pharynx is normal midline uvula.  There is dental caries specifically over the left lower molar, but the sublingual space is soft  Eyes:      Conjunctiva/sclera: Conjunctivae normal.   Cardiovascular:      Rate and Rhythm: Normal rate.      Heart sounds: Normal heart sounds.   Pulmonary:      Effort: Pulmonary effort is normal. No respiratory distress.   Abdominal:      General: Abdomen is flat.      Palpations: Abdomen is soft.      Tenderness: There is no abdominal tenderness.   Musculoskeletal:      Cervical back: Neck supple.   Skin:     General: Skin is warm and dry.   Neurological:      General: No focal deficit present.      Mental Status: He is alert and oriented to person, place, and time.   Psychiatric:         Mood and Affect: Mood normal.       CT IAC POSTERIOR FOSSA W CONTRAST   Final Result   1. Mucosal thickening is seen on the external auditory canals, left more than   right.   2. There is minimal opacification within Prussak's space on the right.   3. The mastoid air cells are clear bilaterally.           ED COURSE & MEDICAL DECISION MAKING    Kvng Snow is a 44 y.o. male with past medical history significant for DM who presents with ear pain.    DDX includes: Otitis media, otitis externa, mastoiditis, malignant otitis externa.  Plan:  Orders Placed This Encounter   Procedures    CT IAC POSTERIOR FOSSA W CONTRAST    CBC with Auto Differential    Basic Metabolic Panel    Teja Mann MD, Otolaryngology, [facility]        Medical reasoning and significant workup findings:   No acute distress, vital signs here are reassuring  This is a

## 2024-11-02 NOTE — ED NOTES
Pt d/c home with AVS no s.s of distress noted script x 2 sent to pharmacy pt denies questions about f/u

## 2024-11-02 NOTE — ED NOTES
Left ear pain for the past 2 days he reports that he tried to flush with salt water thought it might be wax, he is has increased pressure over the past two day